# Patient Record
Sex: FEMALE | Race: WHITE | NOT HISPANIC OR LATINO | Employment: OTHER | URBAN - METROPOLITAN AREA
[De-identification: names, ages, dates, MRNs, and addresses within clinical notes are randomized per-mention and may not be internally consistent; named-entity substitution may affect disease eponyms.]

---

## 2017-04-26 ENCOUNTER — AMBULATORY - HEALTHEAST (OUTPATIENT)
Dept: CARDIOLOGY | Facility: CLINIC | Age: 72
End: 2017-04-26

## 2017-04-26 DIAGNOSIS — Z95.4 S/P TRICUSPID VALVE REPLACEMENT: ICD-10-CM

## 2017-04-26 DIAGNOSIS — I48.91 A-FIB (H): ICD-10-CM

## 2017-07-14 ENCOUNTER — AMBULATORY - HEALTHEAST (OUTPATIENT)
Dept: CARDIOLOGY | Facility: CLINIC | Age: 72
End: 2017-07-14

## 2017-07-14 ENCOUNTER — HOSPITAL ENCOUNTER (OUTPATIENT)
Dept: CARDIOLOGY | Facility: CLINIC | Age: 72
Discharge: HOME | End: 2017-07-14
Attending: INTERNAL MEDICINE

## 2017-07-14 DIAGNOSIS — Z95.4 S/P TRICUSPID VALVE REPLACEMENT: ICD-10-CM

## 2017-07-14 DIAGNOSIS — I27.29 OTHER SECONDARY PULMONARY HYPERTENSION (H): ICD-10-CM

## 2017-07-14 DIAGNOSIS — I48.91 A-FIB (H): ICD-10-CM

## 2017-07-17 LAB
AORTIC VALVE MEAN VELOCITY: 286 CM/S
AV DIMENSIONLESS INDEX VTI: 0.3
AV MEAN GRADIENT: 37 MMHG
AV PEAK GRADIENT: 60.8 MMHG
AV VALVE AREA: 0.6 CM2
AV VELOCITY RATIO: 0.3
DOP CALC AO PEAK VEL: 390 CM/S
DOP CALC AO VTI: 90.1 CM
DOP CALC LVOT AREA: 2.01 CM2
DOP CALC LVOT DIAMETER: 1.6 CM
DOP CALC LVOT PEAK VEL: 110 CM/S
DOP CALC LVOT STROKE VOLUME: 50.4 CM3
DOP CALC MV VTI: 32.8 CM
DOP CALCLVOT PEAK VEL VTI: 25.1 CM
ECHO EJECTION FRACTION ESTIMATED: 60 %
FRACTIONAL SHORTENING: 37.8 % (ref 28–44)
INTERVENTRICULAR SEPTUM IN END DIASTOLE: 1.3 CM (ref 0.6–0.9)
IVS/PW RATIO: 1
LEFT ATRIUM AREA: 26.9 CM2
LEFT VENTRICULAR INTERNAL DIMENSION IN DIASTOLE: 3.7 CM (ref 3.8–5.2)
LEFT VENTRICULAR INTERNAL DIMENSION IN SYSTOLE: 2.3 CM (ref 2.2–3.5)
LEFT VENTRICULAR MASS: 166.5 G
LEFT VENTRICULAR OUTFLOW TRACT MEAN GRADIENT: 3 MMHG
LEFT VENTRICULAR OUTFLOW TRACT MEAN VELOCITY: 75.4 CM/S
LEFT VENTRICULAR OUTFLOW TRACT PEAK GRADIENT: 5 MMHG
LEFT VENTRICULAR POSTERIOR WALL IN END DIASTOLE: 1.3 CM (ref 0.6–0.9)
MITRAL VALVE MEAN INFLOW VELOCITY: 88 CM/S
MITRAL VALVE PEAK VELOCITY: 178 CM/S
MV AREA VTI: 1.54 CM2
MV DECELERATION TIME: 204 MS
MV MEAN GRADIENT: 4 MMHG
MV PEAK E VELOCITY: 211 CM/S
MV PEAK GRADIENT: 12.7 MMHG
MV VALVE AREA BY CONTINUITY EQUATION: 1.5 CM2
PR MAX PG: 5 MMHG
PR PEAK VELOCITY: 108 CM/S
TRICUSPID VALVE ANULAR PLANE SYSTOLIC EXCURSION: 0.8 CM
TRICUSPID VALVE VELOCITY TIME INTEGRAL: 50.6 CM
TV MEAN GRADIENT: 3 MMHG
TV MEAN VELOCITY: 87.3 CM/S
TV PEAK GRADIENT: 9.5 MMHG
TV PEAK VELOCITY: 154 CM/S
TV VALVE AREA BY CONTINUITY EQUATION: 1 CM2

## 2017-07-28 ENCOUNTER — OFFICE VISIT - HEALTHEAST (OUTPATIENT)
Dept: CARDIOLOGY | Facility: CLINIC | Age: 72
End: 2017-07-28

## 2017-07-28 DIAGNOSIS — I48.21 PERMANENT ATRIAL FIBRILLATION (H): ICD-10-CM

## 2017-07-28 DIAGNOSIS — I27.89 OTHER CHRONIC PULMONARY HEART DISEASES: ICD-10-CM

## 2017-07-28 DIAGNOSIS — D64.9 ANEMIA: ICD-10-CM

## 2017-07-28 ASSESSMENT — MIFFLIN-ST. JEOR: SCORE: 987.12

## 2018-03-15 ENCOUNTER — COMMUNICATION - HEALTHEAST (OUTPATIENT)
Dept: CARDIOLOGY | Facility: CLINIC | Age: 73
End: 2018-03-15

## 2018-03-15 DIAGNOSIS — I48.91 ATRIAL FIBRILLATION (H): ICD-10-CM

## 2018-07-16 ENCOUNTER — AMBULATORY - HEALTHEAST (OUTPATIENT)
Dept: CARDIOLOGY | Facility: CLINIC | Age: 73
End: 2018-07-16

## 2018-07-16 ENCOUNTER — HOSPITAL ENCOUNTER (OUTPATIENT)
Dept: CARDIOLOGY | Facility: CLINIC | Age: 73
Discharge: HOME | End: 2018-07-16
Attending: INTERNAL MEDICINE

## 2018-07-16 DIAGNOSIS — I27.89 OTHER CHRONIC PULMONARY HEART DISEASES: ICD-10-CM

## 2018-07-16 DIAGNOSIS — I48.21 PERMANENT ATRIAL FIBRILLATION (H): ICD-10-CM

## 2018-07-16 LAB — BNP SERPL-MCNC: 68 PG/ML (ref 0–127)

## 2018-07-16 ASSESSMENT — MIFFLIN-ST. JEOR: SCORE: 983.49

## 2018-07-17 LAB
AORTIC ROOT: 2.7 CM
AORTIC VALVE MEAN VELOCITY: 274 CM/S
AV DIMENSIONLESS INDEX VTI: 0.2
AV MEAN GRADIENT: 35 MMHG
AV PEAK GRADIENT: 60.8 MMHG
AV VALVE AREA: 0.6 CM2
AV VELOCITY RATIO: 0.2
BSA FOR ECHO PROCEDURE: 1.51 M2
CV BLOOD PRESSURE: NORMAL MMHG
CV ECHO HEIGHT: 64 IN
CV ECHO WEIGHT: 111 LBS
DOP CALC AO PEAK VEL: 390 CM/S
DOP CALC AO VTI: 85 CM
DOP CALC LVOT AREA: 2.54 CM2
DOP CALC LVOT DIAMETER: 1.8 CM
DOP CALC LVOT PEAK VEL: 81.2 CM/S
DOP CALC LVOT STROKE VOLUME: 49.3 CM3
DOP CALC MV VTI: 36.3 CM
DOP CALCLVOT PEAK VEL VTI: 19.4 CM
ECHO EJECTION FRACTION ESTIMATED: 60 %
EJECTION FRACTION: 68 % (ref 55–75)
FRACTIONAL SHORTENING: 36.6 % (ref 28–44)
INTERVENTRICULAR SEPTUM IN END DIASTOLE: 1.1 CM (ref 0.6–0.9)
IVS/PW RATIO: 1
LEFT ATRIUM SIZE: 5 CM
LEFT ATRIUM TO AORTIC ROOT RATIO: 1.85 NO UNITS
LEFT VENTRICLE CARDIAC INDEX: 2.6 L/MIN/M2
LEFT VENTRICLE CARDIAC OUTPUT: 3.9 L/MIN
LEFT VENTRICLE DIASTOLIC VOLUME INDEX: 60.9 CM3/M2 (ref 34–74)
LEFT VENTRICLE DIASTOLIC VOLUME: 92 CM3 (ref 46–106)
LEFT VENTRICLE HEART RATE: 79 BPM
LEFT VENTRICLE MASS INDEX: 100.2 G/M2
LEFT VENTRICLE SYSTOLIC VOLUME INDEX: 19.2 CM3/M2 (ref 11–31)
LEFT VENTRICLE SYSTOLIC VOLUME: 29 CM3 (ref 14–42)
LEFT VENTRICULAR INTERNAL DIMENSION IN DIASTOLE: 4.1 CM (ref 3.8–5.2)
LEFT VENTRICULAR INTERNAL DIMENSION IN SYSTOLE: 2.6 CM (ref 2.2–3.5)
LEFT VENTRICULAR MASS: 151.3 G
LEFT VENTRICULAR OUTFLOW TRACT MEAN GRADIENT: 2 MMHG
LEFT VENTRICULAR OUTFLOW TRACT MEAN VELOCITY: 62.5 CM/S
LEFT VENTRICULAR OUTFLOW TRACT PEAK GRADIENT: 3 MMHG
LEFT VENTRICULAR POSTERIOR WALL IN END DIASTOLE: 1.1 CM (ref 0.6–0.9)
LV STROKE VOLUME INDEX: 32.7 ML/M2
MITRAL VALVE MEAN INFLOW VELOCITY: 101 CM/S
MITRAL VALVE PEAK VELOCITY: 196 CM/S
MV AREA VTI: 1.36 CM2
MV MEAN GRADIENT: 6 MMHG
MV PEAK GRADIENT: 15.4 MMHG
MV VALVE AREA BY CONTINUITY EQUATION: 1.4 CM2
NUC REST DIASTOLIC VOLUME INDEX: 1776 LBS
NUC REST SYSTOLIC VOLUME INDEX: 64 IN
TRICUSPID VALVE ANULAR PLANE SYSTOLIC EXCURSION: 1 CM
TRICUSPID VALVE VELOCITY TIME INTEGRAL: 42.4 CM
TV MEAN GRADIENT: 5 MMHG
TV MEAN VELOCITY: 114 CM/S
TV PEAK GRADIENT: 6.9 MMHG
TV PEAK VELOCITY: 131 CM/S
TV VALVE AREA BY CONTINUITY EQUATION: 1.2 CM2

## 2018-07-25 ENCOUNTER — OFFICE VISIT - HEALTHEAST (OUTPATIENT)
Dept: CARDIOLOGY | Facility: CLINIC | Age: 73
End: 2018-07-25

## 2018-07-25 DIAGNOSIS — I48.21 PERMANENT ATRIAL FIBRILLATION (H): ICD-10-CM

## 2018-07-25 DIAGNOSIS — E78.5 DYSLIPIDEMIA, GOAL LDL BELOW 100: ICD-10-CM

## 2018-07-25 DIAGNOSIS — E03.9 ADULT HYPOTHYROIDISM: ICD-10-CM

## 2018-07-25 ASSESSMENT — MIFFLIN-ST. JEOR: SCORE: 1006.17

## 2018-08-29 ENCOUNTER — COMMUNICATION - HEALTHEAST (OUTPATIENT)
Dept: CARDIOLOGY | Facility: CLINIC | Age: 73
End: 2018-08-29

## 2018-08-29 DIAGNOSIS — I48.91 ATRIAL FIBRILLATION (H): ICD-10-CM

## 2019-03-19 ENCOUNTER — COMMUNICATION - HEALTHEAST (OUTPATIENT)
Dept: CARDIOLOGY | Facility: CLINIC | Age: 74
End: 2019-03-19

## 2019-03-20 ENCOUNTER — HOSPITAL ENCOUNTER (OUTPATIENT)
Dept: CARDIOLOGY | Facility: HOSPITAL | Age: 74
Discharge: HOME | End: 2019-03-20
Attending: INTERNAL MEDICINE

## 2019-03-20 ENCOUNTER — AMBULATORY - HEALTHEAST (OUTPATIENT)
Dept: CARDIOLOGY | Facility: CLINIC | Age: 74
End: 2019-03-20

## 2019-03-20 ENCOUNTER — RECORDS - HEALTHEAST (OUTPATIENT)
Dept: ADMINISTRATIVE | Facility: OTHER | Age: 74
End: 2019-03-20

## 2019-03-20 ENCOUNTER — COMMUNICATION - HEALTHEAST (OUTPATIENT)
Dept: CARDIOLOGY | Facility: CLINIC | Age: 74
End: 2019-03-20

## 2019-03-20 DIAGNOSIS — I35.9 AORTIC VALVE DISORDER: ICD-10-CM

## 2019-03-20 DIAGNOSIS — I08.0 MITRAL AND AORTIC VALVE DISEASE: ICD-10-CM

## 2019-03-20 DIAGNOSIS — I48.21 PERMANENT ATRIAL FIBRILLATION (H): ICD-10-CM

## 2019-03-20 DIAGNOSIS — I50.30 HEART FAILURE WITH PRESERVED EJECTION FRACTION (H): ICD-10-CM

## 2019-03-20 DIAGNOSIS — Z95.4 S/P TRICUSPID VALVE REPLACEMENT: ICD-10-CM

## 2019-03-20 LAB
AORTIC ROOT: 2.4 CM
AORTIC VALVE MEAN VELOCITY: 318 CM/S
AV DIMENSIONLESS INDEX VTI: 0.3
AV MEAN GRADIENT: 43 MMHG
AV PEAK GRADIENT: 64.3 MMHG
AV VALVE AREA: 0.5 CM2
BSA FOR ECHO PROCEDURE: 1.54 M2
CV BLOOD PRESSURE: ABNORMAL MMHG
CV ECHO HEIGHT: 64 IN
CV ECHO WEIGHT: 116 LBS
DOP CALC AO PEAK VEL: 401 CM/S
DOP CALC AO VTI: 98.5 CM
DOP CALC LVOT AREA: 2.01 CM2
DOP CALC LVOT DIAMETER: 1.6 CM
DOP CALC LVOT STROKE VOLUME: 50 CM3
DOP CALC MV VTI: 49.6 CM
DOP CALCLVOT PEAK VEL VTI: 24.9 CM
EJECTION FRACTION: 74 % (ref 55–75)
FRACTIONAL SHORTENING: 33.3 % (ref 28–44)
INTERVENTRICULAR SEPTUM IN END DIASTOLE: 0.9 CM (ref 0.6–0.9)
IVS/PW RATIO: 0.9
LA AREA 1: 43.3 CM2
LA AREA 2: 40.1 CM2
LEFT ATRIUM LENGTH: 7.98 CM
LEFT ATRIUM VOLUME INDEX: 120.1 ML/M2
LEFT ATRIUM VOLUME: 184.9 ML
LEFT VENTRICLE CARDIAC INDEX: 2.2 L/MIN/M2
LEFT VENTRICLE CARDIAC OUTPUT: 3.4 L/MIN
LEFT VENTRICLE DIASTOLIC VOLUME INDEX: 37 CM3/M2 (ref 29–61)
LEFT VENTRICLE DIASTOLIC VOLUME: 57 CM3 (ref 46–106)
LEFT VENTRICLE HEART RATE: 67 BPM
LEFT VENTRICLE MASS INDEX: 49.4 G/M2
LEFT VENTRICLE SYSTOLIC VOLUME INDEX: 9.7 CM3/M2 (ref 8–24)
LEFT VENTRICLE SYSTOLIC VOLUME: 15 CM3 (ref 14–42)
LEFT VENTRICULAR INTERNAL DIMENSION IN DIASTOLE: 3 CM (ref 3.8–5.2)
LEFT VENTRICULAR INTERNAL DIMENSION IN SYSTOLE: 2 CM (ref 2.2–3.5)
LEFT VENTRICULAR MASS: 76 G
LEFT VENTRICULAR OUTFLOW TRACT MEAN GRADIENT: 3 MMHG
LEFT VENTRICULAR OUTFLOW TRACT MEAN VELOCITY: 78 CM/S
LEFT VENTRICULAR POSTERIOR WALL IN END DIASTOLE: 1 CM (ref 0.6–0.9)
LV STROKE VOLUME INDEX: 32.5 ML/M2
MITRAL VALVE DECELERATION SLOPE: ABNORMAL MM/S2
MITRAL VALVE MEAN INFLOW VELOCITY: 136 CM/S
MITRAL VALVE PEAK VELOCITY: 247 CM/S
MITRAL VALVE PRESSURE HALF-TIME: 53 MS
MV AREA VTI: 1.01 CM2
MV AVERAGE E/E' RATIO: 49.4 CM/S
MV DECELERATION TIME: 190 MS
MV E'TISSUE VEL-LAT: 4.97 CM/S
MV E'TISSUE VEL-MED: 4.39 CM/S
MV LATERAL E/E' RATIO: 46.5
MV MEAN GRADIENT: 7 MMHG
MV MEDIAL E/E' RATIO: 52.6
MV PEAK E VELOCITY: 231 CM/S
MV PEAK GRADIENT: 24.4 MMHG
MV VALVE AREA BY CONTINUITY EQUATION: 1 CM2
MV VALVE AREA PRESSURE 1/2 METHOD: 4.2 CM2
NUC REST DIASTOLIC VOLUME INDEX: 1856 LBS
NUC REST SYSTOLIC VOLUME INDEX: 64 IN
TRICUSPID VALVE ANULAR PLANE SYSTOLIC EXCURSION: 1.5 CM
TRICUSPID VALVE VELOCITY TIME INTEGRAL: 51.4 CM
TV MEAN GRADIENT: 6 MMHG
TV MEAN VELOCITY: 107 CM/S
TV PEAK GRADIENT: 13.2 MMHG
TV PEAK VELOCITY: 182 CM/S
TV VALVE AREA BY CONTINUITY EQUATION: 1 CM2

## 2019-03-20 ASSESSMENT — MIFFLIN-ST. JEOR: SCORE: 1006.17

## 2019-03-21 ENCOUNTER — OFFICE VISIT - HEALTHEAST (OUTPATIENT)
Dept: CARDIOLOGY | Facility: CLINIC | Age: 74
End: 2019-03-21

## 2019-03-21 DIAGNOSIS — I50.30 HEART FAILURE WITH PRESERVED EJECTION FRACTION (H): ICD-10-CM

## 2019-03-21 DIAGNOSIS — R60.9 EDEMA, UNSPECIFIED TYPE: ICD-10-CM

## 2019-03-21 LAB
ANION GAP SERPL CALCULATED.3IONS-SCNC: 9 MMOL/L (ref 5–18)
BUN SERPL-MCNC: 13 MG/DL (ref 8–28)
CALCIUM SERPL-MCNC: 8.5 MG/DL (ref 8.5–10.5)
CHLORIDE BLD-SCNC: 101 MMOL/L (ref 98–107)
CO2 SERPL-SCNC: 30 MMOL/L (ref 22–31)
CREAT SERPL-MCNC: 0.68 MG/DL (ref 0.6–1.1)
GFR SERPL CREATININE-BSD FRML MDRD: >60 ML/MIN/1.73M2
GLUCOSE BLD-MCNC: 96 MG/DL (ref 70–125)
POTASSIUM BLD-SCNC: 4.3 MMOL/L (ref 3.5–5)
SODIUM SERPL-SCNC: 140 MMOL/L (ref 136–145)

## 2019-03-21 ASSESSMENT — MIFFLIN-ST. JEOR: SCORE: 1015.24

## 2019-03-25 ENCOUNTER — COMMUNICATION - HEALTHEAST (OUTPATIENT)
Dept: CARDIOLOGY | Facility: CLINIC | Age: 74
End: 2019-03-25

## 2019-03-25 DIAGNOSIS — R60.9 EDEMA, UNSPECIFIED TYPE: ICD-10-CM

## 2019-03-26 ENCOUNTER — SURGERY - HEALTHEAST (OUTPATIENT)
Dept: CARDIOLOGY | Facility: CLINIC | Age: 74
End: 2019-03-26

## 2019-03-28 ENCOUNTER — COMMUNICATION - HEALTHEAST (OUTPATIENT)
Dept: CARDIOLOGY | Facility: CLINIC | Age: 74
End: 2019-03-28

## 2019-04-01 ENCOUNTER — COMMUNICATION - HEALTHEAST (OUTPATIENT)
Dept: CARDIOLOGY | Facility: CLINIC | Age: 74
End: 2019-04-01

## 2019-04-01 DIAGNOSIS — I48.91 ATRIAL FIBRILLATION (H): ICD-10-CM

## 2019-04-02 ENCOUNTER — OFFICE VISIT - HEALTHEAST (OUTPATIENT)
Dept: CARDIOLOGY | Facility: CLINIC | Age: 74
End: 2019-04-02

## 2019-04-02 DIAGNOSIS — I50.30 HEART FAILURE WITH PRESERVED EJECTION FRACTION (H): ICD-10-CM

## 2019-04-02 LAB
ANION GAP SERPL CALCULATED.3IONS-SCNC: 8 MMOL/L (ref 5–18)
BUN SERPL-MCNC: 19 MG/DL (ref 8–28)
CALCIUM SERPL-MCNC: 9.2 MG/DL (ref 8.5–10.5)
CHLORIDE BLD-SCNC: 103 MMOL/L (ref 98–107)
CO2 SERPL-SCNC: 30 MMOL/L (ref 22–31)
CREAT SERPL-MCNC: 0.72 MG/DL (ref 0.6–1.1)
GFR SERPL CREATININE-BSD FRML MDRD: >60 ML/MIN/1.73M2
GLUCOSE BLD-MCNC: 94 MG/DL (ref 70–125)
POTASSIUM BLD-SCNC: 4.5 MMOL/L (ref 3.5–5)
SODIUM SERPL-SCNC: 141 MMOL/L (ref 136–145)

## 2019-04-02 ASSESSMENT — MIFFLIN-ST. JEOR: SCORE: 992.56

## 2019-04-04 ENCOUNTER — COMMUNICATION - HEALTHEAST (OUTPATIENT)
Dept: CARDIOLOGY | Facility: CLINIC | Age: 74
End: 2019-04-04

## 2019-04-05 ENCOUNTER — SURGERY - HEALTHEAST (OUTPATIENT)
Dept: CARDIOLOGY | Facility: CLINIC | Age: 74
End: 2019-04-05

## 2019-04-05 ASSESSMENT — MIFFLIN-ST. JEOR: SCORE: 959.97

## 2019-04-08 ENCOUNTER — AMBULATORY - HEALTHEAST (OUTPATIENT)
Dept: CARDIOLOGY | Facility: CLINIC | Age: 74
End: 2019-04-08

## 2019-04-08 DIAGNOSIS — R60.9 EDEMA, UNSPECIFIED TYPE: ICD-10-CM

## 2019-04-08 DIAGNOSIS — I50.30 HEART FAILURE WITH PRESERVED EJECTION FRACTION (H): ICD-10-CM

## 2019-05-14 ENCOUNTER — AMBULATORY - HEALTHEAST (OUTPATIENT)
Dept: CARDIOLOGY | Facility: CLINIC | Age: 74
End: 2019-05-14

## 2019-05-14 DIAGNOSIS — I48.21 PERMANENT ATRIAL FIBRILLATION (H): ICD-10-CM

## 2019-05-14 DIAGNOSIS — I50.30 HEART FAILURE WITH PRESERVED EJECTION FRACTION (H): ICD-10-CM

## 2019-05-14 LAB
ALBUMIN SERPL-MCNC: 3.5 G/DL (ref 3.5–5)
ALP SERPL-CCNC: 55 U/L (ref 45–120)
ALT SERPL W P-5'-P-CCNC: 22 U/L (ref 0–45)
ANION GAP SERPL CALCULATED.3IONS-SCNC: 8 MMOL/L (ref 5–18)
AST SERPL W P-5'-P-CCNC: 31 U/L (ref 0–40)
BILIRUB SERPL-MCNC: 0.8 MG/DL (ref 0–1)
BNP SERPL-MCNC: 55 PG/ML (ref 0–130)
BUN SERPL-MCNC: 17 MG/DL (ref 8–28)
CALCIUM SERPL-MCNC: 9.4 MG/DL (ref 8.5–10.5)
CHLORIDE BLD-SCNC: 104 MMOL/L (ref 98–107)
CO2 SERPL-SCNC: 28 MMOL/L (ref 22–31)
CREAT SERPL-MCNC: 0.73 MG/DL (ref 0.6–1.1)
GFR SERPL CREATININE-BSD FRML MDRD: >60 ML/MIN/1.73M2
GLUCOSE BLD-MCNC: 100 MG/DL (ref 70–125)
HGB BLD-MCNC: 11.6 G/DL (ref 12–16)
MAGNESIUM SERPL-MCNC: 2.2 MG/DL (ref 1.8–2.6)
POTASSIUM BLD-SCNC: 4.7 MMOL/L (ref 3.5–5)
PROT SERPL-MCNC: 6.9 G/DL (ref 6–8)
SODIUM SERPL-SCNC: 140 MMOL/L (ref 136–145)
TSH SERPL DL<=0.005 MIU/L-ACNC: 0.41 UIU/ML (ref 0.3–5)

## 2019-05-21 ENCOUNTER — OFFICE VISIT - HEALTHEAST (OUTPATIENT)
Dept: CARDIOLOGY | Facility: CLINIC | Age: 74
End: 2019-05-21

## 2019-05-21 DIAGNOSIS — I48.21 PERMANENT ATRIAL FIBRILLATION (H): ICD-10-CM

## 2019-05-21 DIAGNOSIS — E78.5 DYSLIPIDEMIA, GOAL LDL BELOW 100: ICD-10-CM

## 2019-05-21 ASSESSMENT — MIFFLIN-ST. JEOR: SCORE: 974.88

## 2019-08-28 ENCOUNTER — COMMUNICATION - HEALTHEAST (OUTPATIENT)
Dept: ADMINISTRATIVE | Facility: CLINIC | Age: 74
End: 2019-08-28

## 2019-09-23 ENCOUNTER — COMMUNICATION - HEALTHEAST (OUTPATIENT)
Dept: CARDIOLOGY | Facility: CLINIC | Age: 74
End: 2019-09-23

## 2019-09-23 DIAGNOSIS — I48.91 ATRIAL FIBRILLATION (H): ICD-10-CM

## 2019-11-06 ENCOUNTER — OFFICE VISIT - HEALTHEAST (OUTPATIENT)
Dept: CARDIOLOGY | Facility: CLINIC | Age: 74
End: 2019-11-06

## 2019-11-06 DIAGNOSIS — I48.21 PERMANENT ATRIAL FIBRILLATION (H): ICD-10-CM

## 2019-11-06 DIAGNOSIS — R60.9 EDEMA, UNSPECIFIED TYPE: ICD-10-CM

## 2019-11-06 DIAGNOSIS — E78.5 DYSLIPIDEMIA, GOAL LDL BELOW 100: ICD-10-CM

## 2019-11-06 DIAGNOSIS — R06.02 SHORTNESS OF BREATH: ICD-10-CM

## 2019-11-06 ASSESSMENT — MIFFLIN-ST. JEOR: SCORE: 994.84

## 2019-12-17 ENCOUNTER — COMMUNICATION - HEALTHEAST (OUTPATIENT)
Dept: CARDIOLOGY | Facility: CLINIC | Age: 74
End: 2019-12-17

## 2020-01-01 ENCOUNTER — OFFICE VISIT - HEALTHEAST (OUTPATIENT)
Dept: CARDIOLOGY | Facility: CLINIC | Age: 75
End: 2020-01-01

## 2020-01-01 ENCOUNTER — RECORDS - HEALTHEAST (OUTPATIENT)
Dept: LAB | Facility: CLINIC | Age: 75
End: 2020-01-01

## 2020-01-01 ENCOUNTER — COMMUNICATION - HEALTHEAST (OUTPATIENT)
Dept: CARDIOLOGY | Facility: CLINIC | Age: 75
End: 2020-01-01

## 2020-01-01 ENCOUNTER — COMMUNICATION - HEALTHEAST (OUTPATIENT)
Dept: SCHEDULING | Facility: CLINIC | Age: 75
End: 2020-01-01

## 2020-01-01 ENCOUNTER — TELEPHONE (OUTPATIENT)
Dept: SURGERY | Facility: CLINIC | Age: 75
End: 2020-01-01

## 2020-01-01 ENCOUNTER — DOCUMENTATION ONLY (OUTPATIENT)
Dept: PULMONOLOGY | Facility: CLINIC | Age: 75
End: 2020-01-01

## 2020-01-01 ENCOUNTER — OFFICE VISIT - HEALTHEAST (OUTPATIENT)
Dept: PULMONOLOGY | Facility: OTHER | Age: 75
End: 2020-01-01

## 2020-01-01 ENCOUNTER — HOSPITAL ENCOUNTER (OUTPATIENT)
Dept: CARDIOLOGY | Facility: CLINIC | Age: 75
Discharge: HOME | End: 2020-09-03
Attending: INTERNAL MEDICINE

## 2020-01-01 ENCOUNTER — AMBULATORY - HEALTHEAST (OUTPATIENT)
Dept: CARDIOLOGY | Facility: CLINIC | Age: 75
End: 2020-01-01

## 2020-01-01 ENCOUNTER — COMMUNICATION - HEALTHEAST (OUTPATIENT)
Dept: PULMONOLOGY | Facility: OTHER | Age: 75
End: 2020-01-01

## 2020-01-01 ENCOUNTER — PREP FOR PROCEDURE (OUTPATIENT)
Dept: PULMONOLOGY | Facility: CLINIC | Age: 75
End: 2020-01-01

## 2020-01-01 ENCOUNTER — HOSPITAL ENCOUNTER (OUTPATIENT)
Dept: CARDIOLOGY | Facility: CLINIC | Age: 75
Discharge: HOME | End: 2020-11-09
Attending: INTERNAL MEDICINE

## 2020-01-01 ENCOUNTER — HOSPITAL ENCOUNTER (OUTPATIENT)
Dept: RADIOLOGY | Facility: CLINIC | Age: 75
Discharge: HOME | End: 2020-12-01
Attending: INTERNAL MEDICINE

## 2020-01-01 ENCOUNTER — AMBULATORY - HEALTHEAST (OUTPATIENT)
Dept: LAB | Facility: CLINIC | Age: 75
End: 2020-01-01

## 2020-01-01 ENCOUNTER — AMBULATORY - HEALTHEAST (OUTPATIENT)
Dept: PULMONOLOGY | Facility: OTHER | Age: 75
End: 2020-01-01

## 2020-01-01 ENCOUNTER — HOSPITAL ENCOUNTER (OUTPATIENT)
Dept: CT IMAGING | Facility: CLINIC | Age: 75
Discharge: HOME | End: 2020-09-18
Attending: INTERNAL MEDICINE

## 2020-01-01 ENCOUNTER — HOSPITAL ENCOUNTER (OUTPATIENT)
Facility: CLINIC | Age: 75
Discharge: HOME | End: 2020-12-16
Attending: INTERNAL MEDICINE | Admitting: INTERNAL MEDICINE
Payer: COMMERCIAL

## 2020-01-01 ENCOUNTER — HOSPITAL ENCOUNTER (OUTPATIENT)
Dept: CT IMAGING | Facility: CLINIC | Age: 75
Discharge: HOME | End: 2020-09-30
Attending: INTERNAL MEDICINE

## 2020-01-01 ENCOUNTER — AMBULATORY - HEALTHEAST (OUTPATIENT)
Dept: INTENSIVE CARE | Facility: CLINIC | Age: 75
End: 2020-01-01

## 2020-01-01 ENCOUNTER — RECORDS - HEALTHEAST (OUTPATIENT)
Dept: ADMINISTRATIVE | Facility: OTHER | Age: 75
End: 2020-01-01

## 2020-01-01 ENCOUNTER — HOSPITAL ENCOUNTER (OUTPATIENT)
Facility: CLINIC | Age: 75
End: 2020-01-01
Attending: INTERNAL MEDICINE | Admitting: INTERNAL MEDICINE
Payer: COMMERCIAL

## 2020-01-01 ENCOUNTER — TELEPHONE (OUTPATIENT)
Dept: PULMONOLOGY | Facility: CLINIC | Age: 75
End: 2020-01-01

## 2020-01-01 ENCOUNTER — PREP FOR PROCEDURE (OUTPATIENT)
Dept: SURGERY | Facility: CLINIC | Age: 75
End: 2020-01-01

## 2020-01-01 ENCOUNTER — AMBULATORY - HEALTHEAST (OUTPATIENT)
Dept: ULTRASOUND IMAGING | Facility: HOSPITAL | Age: 75
End: 2020-01-01

## 2020-01-01 ENCOUNTER — APPOINTMENT (OUTPATIENT)
Dept: GENERAL RADIOLOGY | Facility: CLINIC | Age: 75
End: 2020-01-01
Attending: INTERNAL MEDICINE
Payer: COMMERCIAL

## 2020-01-01 VITALS
DIASTOLIC BLOOD PRESSURE: 62 MMHG | SYSTOLIC BLOOD PRESSURE: 105 MMHG | RESPIRATION RATE: 18 BRPM | HEART RATE: 73 BPM | OXYGEN SATURATION: 100 %

## 2020-01-01 DIAGNOSIS — I50.23 ACUTE ON CHRONIC SYSTOLIC HEART FAILURE (H): ICD-10-CM

## 2020-01-01 DIAGNOSIS — J90 RECURRENT PLEURAL EFFUSION ON LEFT: ICD-10-CM

## 2020-01-01 DIAGNOSIS — Z95.4 S/P TRICUSPID VALVE REPLACEMENT: ICD-10-CM

## 2020-01-01 DIAGNOSIS — J90 PLEURAL EFFUSION: Primary | ICD-10-CM

## 2020-01-01 DIAGNOSIS — I48.21 PERMANENT ATRIAL FIBRILLATION (H): ICD-10-CM

## 2020-01-01 DIAGNOSIS — Z11.59 ENCOUNTER FOR SCREENING FOR OTHER VIRAL DISEASES: ICD-10-CM

## 2020-01-01 DIAGNOSIS — I50.32 CHRONIC HEART FAILURE WITH PRESERVED EJECTION FRACTION (HFPEF) (H): ICD-10-CM

## 2020-01-01 DIAGNOSIS — I08.0 MITRAL AND AORTIC VALVE DISEASE: ICD-10-CM

## 2020-01-01 DIAGNOSIS — Z95.2 S/P MVR (MITRAL VALVE REPLACEMENT): ICD-10-CM

## 2020-01-01 DIAGNOSIS — J90 PLEURAL EFFUSION: ICD-10-CM

## 2020-01-01 DIAGNOSIS — I48.19 PERSISTENT ATRIAL FIBRILLATION (H): ICD-10-CM

## 2020-01-01 DIAGNOSIS — I50.23 ACUTE ON CHRONIC SYSTOLIC (CONGESTIVE) HEART FAILURE (H): ICD-10-CM

## 2020-01-01 DIAGNOSIS — J90 RECURRENT RIGHT PLEURAL EFFUSION: Primary | ICD-10-CM

## 2020-01-01 DIAGNOSIS — Z11.59 ENCOUNTER FOR SCREENING FOR OTHER VIRAL DISEASES: Primary | ICD-10-CM

## 2020-01-01 DIAGNOSIS — J90 RECURRENT RIGHT PLEURAL EFFUSION: ICD-10-CM

## 2020-01-01 DIAGNOSIS — Z95.2 S/P AVR (AORTIC VALVE REPLACEMENT): ICD-10-CM

## 2020-01-01 DIAGNOSIS — R94.39 ABNORMAL RESULT OF OTHER CARDIOVASCULAR FUNCTION STUDY: ICD-10-CM

## 2020-01-01 DIAGNOSIS — R06.09 DOE (DYSPNEA ON EXERTION): ICD-10-CM

## 2020-01-01 DIAGNOSIS — R06.02 SHORTNESS OF BREATH: ICD-10-CM

## 2020-01-01 LAB
ALBUMIN SERPL-MCNC: 3.5 G/DL (ref 3.5–5)
ALBUMIN SERPL-MCNC: 3.5 G/DL (ref 3.5–5)
ALP SERPL-CCNC: 45 U/L (ref 45–120)
ALT SERPL W P-5'-P-CCNC: 13 U/L (ref 0–45)
ANION GAP SERPL CALCULATED.3IONS-SCNC: 11 MMOL/L (ref 5–18)
ANION GAP SERPL CALCULATED.3IONS-SCNC: 6 MMOL/L (ref 5–18)
ANION GAP SERPL CALCULATED.3IONS-SCNC: 8 MMOL/L (ref 5–18)
ANION GAP SERPL CALCULATED.3IONS-SCNC: 9 MMOL/L (ref 5–18)
AORTIC ROOT: 3 CM
AORTIC VALVE MEAN VELOCITY: 230 CM/S
ASCENDING AORTA: 2.5 CM
AST SERPL W P-5'-P-CCNC: 36 U/L (ref 0–40)
AV DIMENSIONLESS INDEX VTI: 0.4
AV MEAN GRADIENT: 25 MMHG
AV PEAK GRADIENT: 47.6 MMHG
AV VALVE AREA: 1.2 CM2
AV VELOCITY RATIO: 0.5
BILIRUB DIRECT SERPL-MCNC: 0.5 MG/DL
BILIRUB SERPL-MCNC: 2.2 MG/DL (ref 0–1)
BNP SERPL-MCNC: 198 PG/ML (ref 0–133)
BSA FOR ECHO PROCEDURE: 1.52 M2
BSA FOR ECHO PROCEDURE: 1.53 M2
BUN SERPL-MCNC: 23 MG/DL (ref 8–28)
BUN SERPL-MCNC: 23 MG/DL (ref 8–28)
BUN SERPL-MCNC: 26 MG/DL (ref 8–28)
BUN SERPL-MCNC: 26 MG/DL (ref 8–28)
CALCIUM SERPL-MCNC: 8 MG/DL (ref 8.5–10.5)
CALCIUM SERPL-MCNC: 8.3 MG/DL (ref 8.5–10.5)
CALCIUM SERPL-MCNC: 8.3 MG/DL (ref 8.5–10.5)
CALCIUM SERPL-MCNC: 8.7 MG/DL (ref 8.5–10.5)
CHLORIDE BLD-SCNC: 91 MMOL/L (ref 98–107)
CHLORIDE BLD-SCNC: 91 MMOL/L (ref 98–107)
CHLORIDE BLD-SCNC: 92 MMOL/L (ref 98–107)
CHLORIDE BLD-SCNC: 92 MMOL/L (ref 98–107)
CO2 SERPL-SCNC: 27 MMOL/L (ref 22–31)
CO2 SERPL-SCNC: 28 MMOL/L (ref 22–31)
CO2 SERPL-SCNC: 30 MMOL/L (ref 22–31)
CO2 SERPL-SCNC: 31 MMOL/L (ref 22–31)
CREAT SERPL-MCNC: 0.72 MG/DL (ref 0.6–1.1)
CREAT SERPL-MCNC: 0.73 MG/DL (ref 0.6–1.1)
CREAT SERPL-MCNC: 0.75 MG/DL (ref 0.6–1.1)
CREAT SERPL-MCNC: 0.81 MG/DL (ref 0.6–1.1)
CV BLOOD PRESSURE: ABNORMAL MMHG
CV ECHO HEIGHT: 63 IN
CV ECHO WEIGHT: 116 LBS
DOP CALC AO PEAK VEL: 345 CM/S
DOP CALC AO VTI: 69.4 CM
DOP CALC LVOT AREA: 2.83 CM2
DOP CALC LVOT DIAMETER: 1.9 CM
DOP CALC LVOT PEAK VEL: 161 CM/S
DOP CALC LVOT STROKE VOLUME: 79.9 CM3
DOP CALC MV VTI: 35.5 CM
DOP CALCLVOT PEAK VEL VTI: 28.2 CM
EJECTION FRACTION: 57 % (ref 55–75)
FRACTIONAL SHORTENING: 18.4 % (ref 28–44)
GFR SERPL CREATININE-BSD FRML MDRD: >60 ML/MIN/1.73M2
GLUCOSE BLD-MCNC: 132 MG/DL (ref 70–125)
GLUCOSE BLD-MCNC: 133 MG/DL (ref 70–125)
GLUCOSE BLD-MCNC: 150 MG/DL (ref 70–125)
GLUCOSE BLD-MCNC: 195 MG/DL (ref 70–125)
HGB BLD-MCNC: 10 G/DL (ref 12–16)
INR PPP: 1.4 (ref 0.86–1.14)
INR PPP: 2.43 (ref 0.9–1.1)
INR PPP: 3 (ref 0.9–1.1)
INR PPP: 3.12 (ref 0.9–1.1)
INR PPP: 3.17 (ref 0.9–1.1)
INR PPP: 3.51 (ref 0.9–1.1)
INTERVENTRICULAR SEPTUM IN END DIASTOLE: 0.7 CM (ref 0.6–0.9)
IVS/PW RATIO: 0.6
LA AREA 1: 34.7 CM2
LA AREA 2: 44 CM2
LEFT ATRIUM LENGTH: 7.47 CM
LEFT ATRIUM SIZE: 5.4 CM
LEFT ATRIUM TO AORTIC ROOT RATIO: 1.8 NO UNITS
LEFT ATRIUM VOLUME INDEX: 113.6 ML/M2
LEFT ATRIUM VOLUME: 173.7 ML
LEFT VENTRICLE CARDIAC INDEX: 3.4 L/MIN/M2
LEFT VENTRICLE CARDIAC OUTPUT: 5.2 L/MIN
LEFT VENTRICLE DIASTOLIC VOLUME INDEX: 44.4 CM3/M2 (ref 29–61)
LEFT VENTRICLE DIASTOLIC VOLUME: 68 CM3 (ref 46–106)
LEFT VENTRICLE HEART RATE: 65 BPM
LEFT VENTRICLE HEART RATE: 82 BPM
LEFT VENTRICLE MASS INDEX: 66.1 G/M2
LEFT VENTRICLE SYSTOLIC VOLUME INDEX: 19 CM3/M2 (ref 8–24)
LEFT VENTRICLE SYSTOLIC VOLUME: 29 CM3 (ref 14–42)
LEFT VENTRICULAR INTERNAL DIMENSION IN DIASTOLE: 3.8 CM (ref 3.8–5.2)
LEFT VENTRICULAR INTERNAL DIMENSION IN SYSTOLE: 3.1 CM (ref 2.2–3.5)
LEFT VENTRICULAR MASS: 101.1 G
LEFT VENTRICULAR OUTFLOW TRACT MEAN GRADIENT: 7 MMHG
LEFT VENTRICULAR OUTFLOW TRACT MEAN VELOCITY: 116 CM/S
LEFT VENTRICULAR OUTFLOW TRACT PEAK GRADIENT: 10 MMHG
LEFT VENTRICULAR POSTERIOR WALL IN END DIASTOLE: 1.1 CM (ref 0.6–0.9)
LMWH PPP CHRO-ACNC: 0.1 IU/ML
LV STROKE VOLUME INDEX: 52.2 ML/M2
MITRAL VALVE DECELERATION SLOPE: ABNORMAL MM/S2
MITRAL VALVE MEAN INFLOW VELOCITY: 133 CM/S
MITRAL VALVE PEAK VELOCITY: 212 CM/S
MITRAL VALVE PRESSURE HALF-TIME: 55 MS
MV AREA VTI: 2.25 CM2
MV DECELERATION TIME: 185 MS
MV MEAN GRADIENT: 10 MMHG
MV PEAK E VELOCITY: 156 CM/S
MV PEAK GRADIENT: 18 MMHG
MV VALVE AREA BY CONTINUITY EQUATION: 2.3 CM2
MV VALVE AREA PRESSURE 1/2 METHOD: 4 CM2
NUC REST DIASTOLIC VOLUME INDEX: 1856 LBS
NUC REST SYSTOLIC VOLUME INDEX: 63 IN
PHOSPHATE SERPL-MCNC: 3.6 MG/DL (ref 2.5–4.5)
POTASSIUM BLD-SCNC: 3.9 MMOL/L (ref 3.5–5)
POTASSIUM BLD-SCNC: 4.1 MMOL/L (ref 3.5–5)
POTASSIUM BLD-SCNC: 4.7 MMOL/L (ref 3.5–5)
POTASSIUM BLD-SCNC: 4.8 MMOL/L (ref 3.5–5)
PROT SERPL-MCNC: 6.5 G/DL (ref 6–8)
SARS-COV-2 PCR COMMENT: NORMAL
SARS-COV-2 RNA SPEC QL NAA+PROBE: NEGATIVE
SARS-COV-2 RNA SPEC QL NAA+PROBE: NOT DETECTED
SARS-COV-2 VIRUS SPECIMEN SOURCE: NORMAL
SODIUM SERPL-SCNC: 128 MMOL/L (ref 136–145)
SODIUM SERPL-SCNC: 128 MMOL/L (ref 136–145)
SODIUM SERPL-SCNC: 129 MMOL/L (ref 136–145)
SODIUM SERPL-SCNC: 131 MMOL/L (ref 136–145)
SPECIMEN SOURCE: NORMAL
TRICUSPID VALVE VELOCITY TIME INTEGRAL: 50.1 CM
TV MEAN GRADIENT: 7 MMHG
TV MEAN VELOCITY: 108 CM/S
TV PEAK GRADIENT: 13.2 MMHG
TV PEAK VELOCITY: 182 CM/S
TV VALVE AREA BY CONTINUITY EQUATION: 1.6 CM2

## 2020-01-01 PROCEDURE — 85610 PROTHROMBIN TIME: CPT | Performed by: INTERNAL MEDICINE

## 2020-01-01 PROCEDURE — 85520 HEPARIN ASSAY: CPT | Performed by: INTERNAL MEDICINE

## 2020-01-01 PROCEDURE — 71045 X-RAY EXAM CHEST 1 VIEW: CPT | Mod: 26 | Performed by: RADIOLOGY

## 2020-01-01 PROCEDURE — 32550 INSERT PLEURAL CATH: CPT | Performed by: INTERNAL MEDICINE

## 2020-01-01 PROCEDURE — U0003 INFECTIOUS AGENT DETECTION BY NUCLEIC ACID (DNA OR RNA); SEVERE ACUTE RESPIRATORY SYNDROME CORONAVIRUS 2 (SARS-COV-2) (CORONAVIRUS DISEASE [COVID-19]), AMPLIFIED PROBE TECHNIQUE, MAKING USE OF HIGH THROUGHPUT TECHNOLOGIES AS DESCRIBED BY CMS-2020-01-R: HCPCS | Performed by: INTERNAL MEDICINE

## 2020-01-01 PROCEDURE — 999N000028 XR CHEST PORT 1 VW

## 2020-01-01 PROCEDURE — 32551 INSERTION OF CHEST TUBE: CPT | Performed by: INTERNAL MEDICINE

## 2020-01-01 RX ORDER — LIDOCAINE 40 MG/G
CREAM TOPICAL
Status: CANCELLED | OUTPATIENT
Start: 2020-01-01

## 2020-01-01 SDOH — HEALTH STABILITY: MENTAL HEALTH: HOW OFTEN DO YOU HAVE A DRINK CONTAINING ALCOHOL?: NEVER

## 2020-01-01 ASSESSMENT — MIFFLIN-ST. JEOR
SCORE: 990.3
SCORE: 990.3
SCORE: 999.37
SCORE: 926.8
SCORE: 926.8
SCORE: 985.77
SCORE: 966.48
SCORE: 1009.35

## 2020-01-16 ENCOUNTER — COMMUNICATION - HEALTHEAST (OUTPATIENT)
Dept: CARDIOLOGY | Facility: CLINIC | Age: 75
End: 2020-01-16

## 2020-01-20 ENCOUNTER — AMBULATORY - HEALTHEAST (OUTPATIENT)
Dept: CARDIOLOGY | Facility: CLINIC | Age: 75
End: 2020-01-20

## 2020-01-20 DIAGNOSIS — I48.21 PERMANENT ATRIAL FIBRILLATION (H): ICD-10-CM

## 2020-01-20 LAB
ATRIAL RATE - MUSE: 79 BPM
DIASTOLIC BLOOD PRESSURE - MUSE: NORMAL
INTERPRETATION ECG - MUSE: NORMAL
P AXIS - MUSE: 10 DEGREES
PR INTERVAL - MUSE: 302 MS
QRS DURATION - MUSE: 98 MS
QT - MUSE: 400 MS
QTC - MUSE: 458 MS
R AXIS - MUSE: 58 DEGREES
SYSTOLIC BLOOD PRESSURE - MUSE: NORMAL
T AXIS - MUSE: -63 DEGREES
VENTRICULAR RATE- MUSE: 79 BPM

## 2020-01-20 ASSESSMENT — MIFFLIN-ST. JEOR: SCORE: 990.3

## 2020-12-07 PROBLEM — J90 PLEURAL EFFUSION: Status: ACTIVE | Noted: 2020-01-01

## 2020-12-07 NOTE — TELEPHONE ENCOUNTER
Spoke with patient to schedule procedure with Bryan Avendaño    Procedure was scheduled on 12/09 in ENDO  Patient will have H&P with: not needed    Patient is aware a COVID-19 test is needed before their procedure. The test should be with-in 4 days of their procedure.   Test Details: Patient will call [facility] to schedule, wanted something closer    Patient is aware a / is needed day of surgery.   Patient has my direct contact information for any further questions.

## 2020-12-08 NOTE — TELEPHONE ENCOUNTER
I tried to reach Chiquita, left her a VM telling her we needed to cancel her procedure tomorrow because her INR today was flagged high (>4) and sent out for a stat test.   I told her our  will reach out and try to get her rescheduled Friday.   I told her to continue to hold her warfarin and to start enoxaparin injections q 12 hours, but hold dose the night before and morning of surgery.       I called [facility] to get message to the charge nurse there, she will make note of this.

## 2020-12-08 NOTE — TELEPHONE ENCOUNTER
Pt wants us to know she got the message.  Her pharmacy is [facility] [address], [place] [address]  [phone]      Pt requests a call back to answer questions

## 2020-12-09 NOTE — TELEPHONE ENCOUNTER
Patient was rescheduled to 12/11 with Dr. Knott  Patient had no other questions, procedure is dependent on INR tomorrow.

## 2020-12-09 NOTE — PROGRESS NOTES
I called Chiquita to review the change in her scheduled procedure with Dr. Avendaño and to let her know that the enoxaparin Rx was sent to the [facility], as requested.   I reviewed the instructions for taking this q 12 hrs, and to hold it the night before and morning of the procedure.   She confirmed that she is currently holding her warfarin, as well.    She said she is scheduled for another INR at her [facility] clinic at noon Thursday, 12/10.    I told her our  would be trying to get her rescheduled Friday but it will all hinge on the INR results.

## 2020-12-15 NOTE — TELEPHONE ENCOUNTER
Called patient back as she had questions regarding her medications she should take tomorrow prior to her procedure. Her sister Edith answered the phone and had other questions regarding the Pleurx procedure. Patient can hold all medications except Metoprolol. She had an INR done yesterday and it was 1.6. Answered all of Mary questions.

## 2020-12-15 NOTE — TELEPHONE ENCOUNTER
Received message from Dr. Avendaño that patient's sister was calling with questions about Chiquita's procedure tomorrow. I called her back but there was no answer. Message left with call back information.

## 2020-12-16 NOTE — PROCEDURES
INTERVENTIONAL PULMONOLOGY       Procedure(s):    Tunnelled pleural catheter placement (left chest)    Indication:  Recurrent left pleural effusion    Attending of Record:  Marino Gottlieb MD     Interventional Pulmonary Fellow   None    Trainees Present:   None     Medications:    15 ml 1% lidocaine    Sedation Time:   None    Time Out:  Performed    The patient's medical record has been reviewed.  The indication for the procedure was reviewed.  The necessary history and physical examination was performed and reviewed.  The risks, benefits and alternatives of the procedure were discussed with the the patient in detail and she had the opportunity to ask questions.  I discussed in particular the potential complications including risks of minor or life-threatening bleeding and/or infection, respiratory failure, vocal cord trauma / paralysis, pneumothorax, and discomfort. Sedation risks were also discussed including abnormal heart rhythms, low blood pressure, and respiratory failure. All questions were answered to the best of my ability.  Verbal and written informed consent was obtained.  The proposed procedure and the patient's identification were verified prior to the procedure by the physician and the nurse, respiratory therapist.    The patient was assessed for the adequacy for the procedure and to receive medications.   Mental Status:  Alert and oriented x 3  Airway examination:  Class I (Complete visualization of soft palate)  Pulmonary:  Decreased breathsound throughout  CV:  RRR, no murmurs or gallops  ASA Grade:  (I)  Normal healthy patient    After clinical evaluation and reviewing the indication, risks, alternatives and benefits of the procedure the patient was deemed to be in satisfactory condition to undergo the procedure.           A Tuberculosis risk assessment was performed:  The patient has no known RISK of Tuberculosis    The procedure was performed in a negative airflow room: The patient could not  be moved to a negative airflow room because of critical illness and instability    Maneuvers / Procedure:      Indwelling pleural catheter insertion: Once the site was marked using US, the pleurX catheter kit was used for the procedure. The patient's left chest was prepped in sterile fashion. A total of 10 1%lidocaine used to anesthetize the area. A finder needle was used to aspirate fluid. The wire was advanced using seldinger technique. A 1cm incision was made approximately 5cm anterior to the wire and at the wire site. The tunneling device was used to make a track towards the wire and insert the chest tube. A dilator was used to enlarge the track, then the peel-away catheter was used to insert the chest tube into the pleural space. The catheter was sutured into place using 2.0silk. A sterile dressing was placed. A total of 900cc was drained     Any disposable equipment was visually inspected and deemed to be intact immediately post procedure.      Relevant Pictures      Recommendations:     -->  Successful left IPC placement  -->  Post-op CXR  -->  Plan to drain daily for next 12 weeks. Clinic appt with me in 4-6wks  -->  Pt education consult      Marino Gottlieb MD, MHA    of Medicine  Interventional Pulmonary  Department of Pulmonary, Allergy, Critical Care and Sleep Medicine   [facility]  Pager: [phone]   Office: [phone]  Email: [email or web address]    Romina ROBLES, OCN  Clinical Nurse Specialist  Department of Thoracic Surgery  Office: [phone]  Email: [email or web address]    Kathia White  Interventional Pulmonology Surgery Scheduler  Office: [phone]  Email: [email or web address]

## 2020-12-16 NOTE — TELEPHONE ENCOUNTER
Called sister Edith back as she left a voicemail for me this morning that patient had malt o meal for breakfast at 9am. Dr. Gottlieb said this was okay but to not eat anything else.    Tried to call the sister back but there was no answer. When I talked with Edith yesterday she said they were going to be to the hospital between 10:30 and 11am.

## 2020-12-16 NOTE — OR NURSING
Pleurx catheter insertion, no sedation and tolerated well. Pt will have teaching with her sister as an outpatient at the patient learning center.   Drainage 800 ml of rach fluid.

## 2020-12-20 NOTE — TELEPHONE ENCOUNTER
Received a phone call from Ms. Florse's sister (Edith) regarding PleurX catheter management.    Briefly, Ms. 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 She underwent PleurX cather placement with Dr. Gottlieb on 12/16/2020.    Sharon calls today as they have not been educated yet on how to manage the PleurX cather, including how to drain it on a regular basis. Per Edith, Ms. Flores is having some increased shortness of breath today.    Dr. Roa, Interventional Pulmonary fellow, was able to join in on the phone call and guided Edith on how to properly drain the catheter. She was then able to drain approximately 1 L of pleural fluid. We will follow up with Dr. Gottlieb's team tomorrow regarding further catheter management and education.

## 2021-01-01 ENCOUNTER — ANCILLARY PROCEDURE (OUTPATIENT)
Dept: GENERAL RADIOLOGY | Facility: CLINIC | Age: 76
End: 2021-01-01
Attending: CLINICAL NURSE SPECIALIST
Payer: COMMERCIAL

## 2021-01-01 ENCOUNTER — TELEPHONE (OUTPATIENT)
Dept: SURGERY | Facility: CLINIC | Age: 76
End: 2021-01-01

## 2021-01-01 ENCOUNTER — VIRTUAL VISIT (OUTPATIENT)
Dept: PULMONOLOGY | Facility: CLINIC | Age: 76
End: 2021-01-01
Attending: INTERNAL MEDICINE
Payer: COMMERCIAL

## 2021-01-01 ENCOUNTER — COMMUNICATION - HEALTHEAST (OUTPATIENT)
Dept: CARDIOLOGY | Facility: CLINIC | Age: 76
End: 2021-01-01

## 2021-01-01 ENCOUNTER — OFFICE VISIT - HEALTHEAST (OUTPATIENT)
Dept: PULMONOLOGY | Facility: OTHER | Age: 76
End: 2021-01-01

## 2021-01-01 DIAGNOSIS — J90 PLEURAL EFFUSION: ICD-10-CM

## 2021-01-01 DIAGNOSIS — J90 RECURRENT RIGHT PLEURAL EFFUSION: ICD-10-CM

## 2021-01-01 DIAGNOSIS — J90 PLEURAL EFFUSION: Primary | ICD-10-CM

## 2021-01-01 PROCEDURE — 71046 X-RAY EXAM CHEST 2 VIEWS: CPT | Mod: GC | Performed by: RADIOLOGY

## 2021-01-01 PROCEDURE — 71046 X-RAY EXAM CHEST 2 VIEWS: CPT | Performed by: RADIOLOGY

## 2021-01-01 PROCEDURE — 99214 OFFICE O/P EST MOD 30 MIN: CPT | Mod: 95 | Performed by: INTERNAL MEDICINE

## 2021-01-01 ASSESSMENT — MIFFLIN-ST. JEOR: SCORE: 926.8

## 2021-01-18 NOTE — TELEPHONE ENCOUNTER
"I called patient's sister, Edith who had questions about the Pleurx output.  She has drained daily for 300-400ml, fluid is reddish and there can be a lot of bubbles in the drainage container.  Sometimes it takes 20-25\" to be done draining and she worried this was not normal. She will be getting a CXR later this and week, and talking to Dr. Gottlieb next week.   Edith said Alla is laying down much of the day, not experiencing any shortness of breath.     I reassured her about these issues and told her that the tube must be in the right position if she gets that much fluid out daily, told her the CXR will let Dr. Gottlieb know about position and see how much fluid is there and he will answer any additional questions.  She appreciated my call.  "

## 2021-01-26 NOTE — LETTER
"1/26/2021       RE: Chiquita Flores  [address] [facility] [address]     Dear Colleague,    Thank you for referring your patient, Chiquita Flores, to the [facility] at [facility]. Please see a copy of my visit note below.    Chiquita is a 75 year old who is being evaluated via a billable telephone visit.      What phone number would you like to be contacted at? [phone]  How would you like to obtain your AVS? Mail a copy     I have reviewed and updated the patient's allergies and medication list.    Concerns: none  Refills: none     Vitals - Patient Reported  Weight (Patient Reported): 45.4 kg (100 lb)  Height (Patient Reported): 160 cm (5' 3\")  BMI (Based on Pt Reported Ht/Wt): 17.71  Pain Score: No Pain (0)    Sherrie Pereira CMA        Phone call duration: 25 minutes      LUNG NODULE & INTERVENTIONAL PULMONARY CLINIC  CLINICS & SURGERY CENTER, [facility]   VIRTUAL TELEPHONE VISIT    Chiquita Flores MRN# [id number]   Age: 75 year old YOB: 1945     Reason for Consultation: pleurx    Requesting Physician: Chalino Donovan MD  No address on file    Chiquita Flores is a 75 year old female who is being evaluated via a billable telephone visit.      The patient has been notified of following: \"This telephone visit will be conducted via a call between you and your physician/provider. We have found that certain health care needs can be provided without the need for a physical exam.  This service lets us provide the care you need with a short phone conversation.  If a prescription is necessary we can send it directly to your pharmacy.  If lab work is needed we can place an order for that and you can then stop by our lab to have the test done at a later time. Telephone visits are billed at different rates depending on your insurance coverage. During this emergency period, for some " "insurers they may be billed the same as an in-person visit.  Please reach out to your insurance provider with any questions. If during the course of the call the physician/provider feels a telephone visit is not appropriate, you will not be charged for this service.\"    Patient has given verbal consent for Telephone visit?  Yes    How would you like to obtain your AVS? Erumhart    Phone call duration: 25 minutes    Additional provider notes: see below     Assessment and Plan:    1. 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 Draining 350-500cc daily. Skin site is clean per sister. Will cont daily drainage until mid March. F/u in clinic with cxr at that point.     Billing: I spent 30 minutes on direct patient care and greater than 50% of thise time was spent on counseling and coordination of care about the issues above    Marino Gottlieb MD   of Medicine  Interventional Pulmonology  Department of Pulmonary, Allergy, Critical Care and Sleep Medicine   [facility]  Pager: [phone]          History:     Chiquita Flores is a 75 year old female with sig h/o for CHF, severe ascites, cardiac cirrhosis, atrial fibrillation, hypothyroidism who is here for evaluation/followup of pleural effusion.    - No new resp sx or complaints. Denies dyspnea or cough.   - here for follow-up pleurx which was placed 12/16. Draining 350-500cc per day since placement.   - Personal hx of cancer: no cancer. Up-to-date on mammo and c-scope   - Family hx of cancer: no lung cancer  - Exposure hx: Denies asbestos or radon exposure   - Tobacco hx: Never smoker   - My interpretation of the images relevant for this visit includes: minimal effusions   - My interpretation of the PFT's relevant for this visit includes: None     CXR  Minimal effusions. Left pleurx in place.     Other active medical problems include:   - has CHF, afib and cardiac cirrhosis. Stable on coumadin.   - has hypothyroidism. stable "          Past Medical History:    No past medical history on file.            Past Surgical History:      Past Surgical History:   Procedure Laterality Date     INSERT CHEST TUBE Left 12/16/2020    Procedure: INSERTION, CATHETER, INTERCOSTAL, FOR DRAINAGE, pleurx catheter placement;  Surgeon: Marino Gottlieb MD;  Location: [facility]     REDO STERNOTOMY REPLACE VALVE TRICUSPID N/A 9/1/2015    Procedure: REDO STERNOTOMY REPLACE VALVE TRICUSPID;  Surgeon: Neo Teague MD;  Location:  OR          Social History:     Social History     Tobacco Use     Smoking status: Never Smoker     Smokeless tobacco: Never Used   Substance Use Topics     Alcohol use: Never     Alcohol/week: 0.0 standard drinks     Frequency: Never          Family History:   No family history on file.          Allergies:    No Known Allergies          Medications:     Current Outpatient Medications   Medication Sig     acetaminophen (TYLENOL) 325 MG tablet Take 325-650 mg by mouth every 6 hours as needed for mild pain     alendronate (FOSAMAX) 70 MG tablet Take 70 mg by mouth every 7 days     blood glucose monitoring (NO BRAND SPECIFIED) test strip Use to test blood sugar 2 times daily or as directed.     Calcium Carb-Cholecalciferol 600-400 MG-UNIT TABS      CANE, ANY MATERIAL      Cream Base (EMOLLIENT BASE) CREA      diltiazem (CARDIZEM) 30 MG tablet Take 1 tablet (30 mg) by mouth every 6 hours     enoxaparin ANTICOAGULANT (LOVENOX ANTICOAGULANT) 40 MG/0.4ML syringe Inject 0.4 mLs (40 mg) Subcutaneous 2 times daily Hold this injection the night BEFORE and the MORNING of surgery.     furosemide (LASIX) 40 MG tablet Take 0.5 tablets (20 mg) by mouth 2 times daily     lancets 28G MISC      levothyroxine (SYNTHROID, LEVOTHROID) 125 MCG tablet Take 125 mcg by mouth daily     lisinopril (PRINIVIL,ZESTRIL) 2.5 MG tablet Take 1 tablet (2.5 mg) by mouth daily     metoprolol (TOPROL-XL) 25 MG 24 hr tablet Take 1.5 tablets (37.5 mg) by mouth daily     Misc.  Devices (ROLLER WALKER) MISC 2 Wheeled Walker for home use. For 12 weeks.     simvastatin (ZOCOR) 20 MG tablet Take 0.5 tablets (10 mg) by mouth At Bedtime     warfarin (COUMADIN) 5 MG tablet Take 1.5 tablets (7.5 mg) by mouth daily Take 1.5 tablets daily, or as directed by INR clinic.   Check INR Monday 9/14/15, goal INR is 2.5 - 3.5 for mechanical MVR/AVR     No current facility-administered medications for this visit.           Review of Systems:     CONSTITUTIONAL: negative for fever, chills, change in weight  INTEGUMENTARY/SKIN: no rash or obvious new lesions  ENT/MOUTH: no sore throat, new sinus pain or nasal drainage  RESP: see interval history  CV: negative for chest pain, palpitations or peripheral edema  GI: no nausea, vomiting, change in stools  : no dysuria  MUSCULOSKELETAL: no myalgias, arthralgias  ENDOCRINE: blood sugars with adequate control  PSYCHIATRIC: mood stable  LYMPHATIC: no new lymphadenopathy  HEME: no bleeding or easy bruisability  NEURO: no numbness, weakness, headaches         Physical Exam:      A comprehensive physical examination is deferred due to PHE (public health emergency) telephone visit restrictions.         Current Laboratory Data:   All laboratory and imaging data reviewed.    Results for orders placed or performed in visit on 01/26/21 (from the past 24 hour(s))   XR Chest 2 Views    Narrative    EXAMINATION:  XR CHEST 2 VW 1/26/2021 11:21 AM.    COMPARISON: 1/20/2021.    HISTORY:  Pleural effusion    FINDINGS: PA and lateral views of the chest. Postsurgical changes of  tricuspid valve replacement with intact median sternotomy wires and  mediastinal surgical clips. Midline trachea. Unchanged cardiomegaly.  Small bilateral pleural effusions, greater on the right similar to  prior. Bibasilar streaky opacities. No new focal airspace  consolidation. No pneumothorax. Left basilar chest tube.      Impression    IMPRESSION:   1. Stable small bilateral pleural effusions with  associated  atelectasis. No acute airspace opacity.  2. Stable cardiomegaly.    I have personally reviewed the examination and initial interpretation  and I agree with the findings.    COLTON HAINES MD            Previous Cardiology Imaging   No results found for this or any previous visit (from the past 8760 hour(s)).                   Again, thank you for allowing me to participate in the care of your patient.      Sincerely,    Marino Gottlieb MD

## 2021-01-26 NOTE — LETTER
Date:January 28, 2021      Patient was self referred, no letter generated. Do not send.        [facility] Information

## 2021-01-26 NOTE — PROGRESS NOTES
"LUNG NODULE & INTERVENTIONAL PULMONARY CLINIC  CLINICS & SURGERY CENTER, [facility], [facility]   VIRTUAL TELEPHONE VISIT    Chiquita Flores MRN# [id number]   Age: 75 year old YOB: 1945     Reason for Consultation: pleurx    Requesting Physician: Chalino Donovan MD  No address on file    Chiquita Flores is a 75 year old female who is being evaluated via a billable telephone visit.      The patient has been notified of following: \"This telephone visit will be conducted via a call between you and your physician/provider. We have found that certain health care needs can be provided without the need for a physical exam.  This service lets us provide the care you need with a short phone conversation.  If a prescription is necessary we can send it directly to your pharmacy.  If lab work is needed we can place an order for that and you can then stop by our lab to have the test done at a later time. Telephone visits are billed at different rates depending on your insurance coverage. During this emergency period, for some insurers they may be billed the same as an in-person visit.  Please reach out to your insurance provider with any questions. If during the course of the call the physician/provider feels a telephone visit is not appropriate, you will not be charged for this service.\"    Patient has given verbal consent for Telephone visit?  Yes    How would you like to obtain your AVS? MyChart    Phone call duration: 25 minutes    Additional provider notes: see below     Assessment and Plan:    1. 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 Draining 350-500cc daily. Skin site is clean per sister. Will cont daily drainage until mid March. F/u in clinic with cxr at that point.     Billing: I spent 30 minutes on direct patient care and greater than 50% of thise time was spent on counseling and coordination of care about the issues above    Marino Gottlieb MD  Assistant " Professor of Medicine  Interventional Pulmonology  Department of Pulmonary, Allergy, Critical Care and Sleep Medicine   [facility], [facility]  Pager: [phone]          History:     Chiquita Flores is a 75 year old female with sig h/o for CHF, severe ascites, cardiac cirrhosis, atrial fibrillation, hypothyroidism who is here for evaluation/followup of pleural effusion.    - No new resp sx or complaints. Denies dyspnea or cough.   - here for follow-up pleurx which was placed 12/16. Draining 350-500cc per day since placement.   - Personal hx of cancer: no cancer. Up-to-date on mammo and c-scope   - Family hx of cancer: no lung cancer  - Exposure hx: Denies asbestos or radon exposure   - Tobacco hx: Never smoker   - My interpretation of the images relevant for this visit includes: minimal effusions   - My interpretation of the PFT's relevant for this visit includes: None     CXR  Minimal effusions. Left pleurx in place.     Other active medical problems include:   - has CHF, afib and cardiac cirrhosis. Stable on coumadin.   - has hypothyroidism. stable          Past Medical History:    No past medical history on file.            Past Surgical History:      Past Surgical History:   Procedure Laterality Date     INSERT CHEST TUBE Left 12/16/2020    Procedure: INSERTION, CATHETER, INTERCOSTAL, FOR DRAINAGE, pleurx catheter placement;  Surgeon: Marino Gottlieb MD;  Location: [facility]     REDO STERNOTOMY REPLACE VALVE TRICUSPID N/A 9/1/2015    Procedure: REDO STERNOTOMY REPLACE VALVE TRICUSPID;  Surgeon: Neo Teague MD;  Location:  OR          Social History:     Social History     Tobacco Use     Smoking status: Never Smoker     Smokeless tobacco: Never Used   Substance Use Topics     Alcohol use: Never     Alcohol/week: 0.0 standard drinks     Frequency: Never          Family History:   No family history on file.          Allergies:    No Known Allergies          Medications:     Current Outpatient  Medications   Medication Sig     acetaminophen (TYLENOL) 325 MG tablet Take 325-650 mg by mouth every 6 hours as needed for mild pain     alendronate (FOSAMAX) 70 MG tablet Take 70 mg by mouth every 7 days     blood glucose monitoring (NO BRAND SPECIFIED) test strip Use to test blood sugar 2 times daily or as directed.     Calcium Carb-Cholecalciferol 600-400 MG-UNIT TABS      CANE, ANY MATERIAL      Cream Base (EMOLLIENT BASE) CREA      diltiazem (CARDIZEM) 30 MG tablet Take 1 tablet (30 mg) by mouth every 6 hours     enoxaparin ANTICOAGULANT (LOVENOX ANTICOAGULANT) 40 MG/0.4ML syringe Inject 0.4 mLs (40 mg) Subcutaneous 2 times daily Hold this injection the night BEFORE and the MORNING of surgery.     furosemide (LASIX) 40 MG tablet Take 0.5 tablets (20 mg) by mouth 2 times daily     lancets 28G MISC      levothyroxine (SYNTHROID, LEVOTHROID) 125 MCG tablet Take 125 mcg by mouth daily     lisinopril (PRINIVIL,ZESTRIL) 2.5 MG tablet Take 1 tablet (2.5 mg) by mouth daily     metoprolol (TOPROL-XL) 25 MG 24 hr tablet Take 1.5 tablets (37.5 mg) by mouth daily     Misc. Devices (ROLLER WALKER) MISC 2 Wheeled Walker for home use. For 12 weeks.     simvastatin (ZOCOR) 20 MG tablet Take 0.5 tablets (10 mg) by mouth At Bedtime     warfarin (COUMADIN) 5 MG tablet Take 1.5 tablets (7.5 mg) by mouth daily Take 1.5 tablets daily, or as directed by INR clinic.   Check INR Monday 9/14/15, goal INR is 2.5 - 3.5 for mechanical MVR/AVR     No current facility-administered medications for this visit.           Review of Systems:     CONSTITUTIONAL: negative for fever, chills, change in weight  INTEGUMENTARY/SKIN: no rash or obvious new lesions  ENT/MOUTH: no sore throat, new sinus pain or nasal drainage  RESP: see interval history  CV: negative for chest pain, palpitations or peripheral edema  GI: no nausea, vomiting, change in stools  : no dysuria  MUSCULOSKELETAL: no myalgias, arthralgias  ENDOCRINE: blood sugars with adequate  control  PSYCHIATRIC: mood stable  LYMPHATIC: no new lymphadenopathy  HEME: no bleeding or easy bruisability  NEURO: no numbness, weakness, headaches         Physical Exam:      A comprehensive physical examination is deferred due to PHE (public health emergency) telephone visit restrictions.         Current Laboratory Data:   All laboratory and imaging data reviewed.    Results for orders placed or performed in visit on 01/26/21 (from the past 24 hour(s))   XR Chest 2 Views    Narrative    EXAMINATION:  XR CHEST 2 VW 1/26/2021 11:21 AM.    COMPARISON: 1/20/2021.    HISTORY:  Pleural effusion    FINDINGS: PA and lateral views of the chest. Postsurgical changes of  tricuspid valve replacement with intact median sternotomy wires and  mediastinal surgical clips. Midline trachea. Unchanged cardiomegaly.  Small bilateral pleural effusions, greater on the right similar to  prior. Bibasilar streaky opacities. No new focal airspace  consolidation. No pneumothorax. Left basilar chest tube.      Impression    IMPRESSION:   1. Stable small bilateral pleural effusions with associated  atelectasis. No acute airspace opacity.  2. Stable cardiomegaly.    I have personally reviewed the examination and initial interpretation  and I agree with the findings.    COLTON HAINES MD            Previous Cardiology Imaging   No results found for this or any previous visit (from the past 8760 hour(s)).

## 2021-01-26 NOTE — PROGRESS NOTES
"Chiquita is a 75 year old who is being evaluated via a billable telephone visit.      What phone number would you like to be contacted at? [phone]  How would you like to obtain your AVS? Mail a copy     I have reviewed and updated the patient's allergies and medication list.    Concerns: none  Refills: none     Vitals - Patient Reported  Weight (Patient Reported): 45.4 kg (100 lb)  Height (Patient Reported): 160 cm (5' 3\")  BMI (Based on Pt Reported Ht/Wt): 17.71  Pain Score: No Pain (0)    Sherrie Pereira CMA        Phone call duration: 25 minutes    "

## 2021-03-16 NOTE — PROGRESS NOTES
Chiquita is a 76 year old who is being evaluated via a billable telephone visit.      What phone number would you like to be contacted at? [phone]  How would you like to obtain your AVS? Mail a copy     The individual isn't 100% about her medications.     Charley Cornelius, FRANSICO March 16, 2021  1:00 PM       Phone call duration: 25 minutes

## 2021-03-16 NOTE — LETTER
"3/16/2021       RE: Chiquita Flores  [address] [address]     Dear Colleague,    Thank you for referring your patient[facility]iquita Flores[facility]. Please see a copy of my visit note below.    Chiquita is a 76 year old who is being evaluated via a billable telephone visit.      What phone number would you like to be contacted at? [phone]  How would you like to obtain your AVS? Mail a copy     The individual isn't 100% about her medications.     Charley Cornelius, FRANSICO March 16, 2021  1:00 PM       Phone call duration: 25 minutes    LUNG NODULE & INTERVENTIONAL PULMONARY CLINIC  CLINICS & SURGERY CENTER, [facility]   VIRTUAL TELEPHONE VISIT    Chiquita Flores MRN# [id number]   Age: 76 year old YOB: 1945     Reason for Consultation: lung nodule(s)    Chiquita Flores is a 76 year old female who is being evaluated via a billable telephone visit.      The patient has been notified of following: \"This telephone visit will be conducted via a call between you and your physician/provider. We have found that certain health care needs can be provided without the need for a physical exam.  This service lets us provide the care you need with a short phone conversation.  If a prescription is necessary we can send it directly to your pharmacy.  If lab work is needed we can place an order for that and you can then stop by our lab to have the test done at a later time. Telephone visits are billed at different rates depending on your insurance coverage. During this emergency period, for some insurers they may be billed the same as an in-person visit.  Please reach out to your insurance provider with any questions. If during the course of the call the physician/provider feels a telephone visit is not appropriate, you will not be charged for this " "service.\"    Patient has given verbal consent for Telephone visit?  Yes    How would you like to obtain your AVS? MyChart    Phone call duration: 25 minutes    Additional provider notes: see below     Assessment and Plan:    1. 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 Draining 340cc daily. Skin site is clean per sister. No issues with drainage. Given that she is moving towards hospice care, I told her she could follow-up with as needed unless there is concern for infection and/or no drainage for 2 consecutive days.       Billing: I spent 30 minutes including face to face, chart review, personal and documented interpretation of results, counseling and coordination of care about the issues above.      Marino Gottlieb MD   of Medicine  Interventional Pulmonology  Department of Pulmonary, Allergy, Critical Care and Sleep Medicine   [facility]  Pager: [phone]          History:     Chiquita Flores is a 75 year old female with sig h/o for CHF, severe ascites, cardiac cirrhosis, atrial fibrillation, hypothyroidism who is here for evaluation/followup of pleural effusion.     - No new resp sx or complaints. Denies dyspnea or cough.   - here for follow-up pleurx which was placed 12/16. Draining ~340cc daily. Now in hospice.   - Personal hx of cancer: no cancer. Up-to-date on mammo and c-scope   - Family hx of cancer: no lung cancer  - Exposure hx: Denies asbestos or radon exposure   - Tobacco hx: Never smoker   - My interpretation of the images relevant for this visit includes: minimal effusions   - My interpretation of the PFT's relevant for this visit includes: None      CXR  Minimal effusions. Left pleurx in place.      Other active medical problems include:   - has CHF, afib and cardiac cirrhosis. Stable on coumadin.   - has hypothyroidism. stable          Past Medical History:   CHF, severe ascites, cardiac cirrhosis, atrial fibrillation, hypothyroidism         Past " Surgical History:      Past Surgical History:   Procedure Laterality Date     INSERT CHEST TUBE Left 12/16/2020    Procedure: INSERTION, CATHETER, INTERCOSTAL, FOR DRAINAGE, pleurx catheter placement;  Surgeon: Marino Gottlieb MD;  Location: [facility]     REDO STERNOTOMY REPLACE VALVE TRICUSPID N/A 9/1/2015    Procedure: REDO STERNOTOMY REPLACE VALVE TRICUSPID;  Surgeon: Neo Teague MD;  Location:  OR          Social History:     Social History     Tobacco Use     Smoking status: Never Smoker     Smokeless tobacco: Never Used   Substance Use Topics     Alcohol use: Never     Alcohol/week: 0.0 standard drinks     Frequency: Never          Family History:   No family history on file.        Allergies:    No Known Allergies       Medications:     Current Outpatient Medications   Medication Sig     levothyroxine (SYNTHROID, LEVOTHROID) 125 MCG tablet Take 125 mcg by mouth daily     warfarin (COUMADIN) 5 MG tablet Take 1.5 tablets (7.5 mg) by mouth daily Take 1.5 tablets daily, or as directed by INR clinic.   Check INR Monday 9/14/15, goal INR is 2.5 - 3.5 for mechanical MVR/AVR     acetaminophen (TYLENOL) 325 MG tablet Take 325-650 mg by mouth every 6 hours as needed for mild pain     alendronate (FOSAMAX) 70 MG tablet Take 70 mg by mouth every 7 days     blood glucose monitoring (NO BRAND SPECIFIED) test strip Use to test blood sugar 2 times daily or as directed.     Calcium Carb-Cholecalciferol 600-400 MG-UNIT TABS      CANE, ANY MATERIAL      Cream Base (EMOLLIENT BASE) CREA      diltiazem (CARDIZEM) 30 MG tablet Take 1 tablet (30 mg) by mouth every 6 hours     enoxaparin ANTICOAGULANT (LOVENOX ANTICOAGULANT) 40 MG/0.4ML syringe Inject 0.4 mLs (40 mg) Subcutaneous 2 times daily Hold this injection the night BEFORE and the MORNING of surgery. (Patient not taking: Reported on 3/16/2021)     furosemide (LASIX) 40 MG tablet Take 0.5 tablets (20 mg) by mouth 2 times daily     lancets 28G MISC      lisinopril  (PRINIVIL,ZESTRIL) 2.5 MG tablet Take 1 tablet (2.5 mg) by mouth daily     metoprolol (TOPROL-XL) 25 MG 24 hr tablet Take 1.5 tablets (37.5 mg) by mouth daily     Misc. Devices (ROLLER WALKER) MISC 2 Wheeled Walker for home use. For 12 weeks.     simvastatin (ZOCOR) 20 MG tablet Take 0.5 tablets (10 mg) by mouth At Bedtime     No current facility-administered medications for this visit.           Review of Systems:     CONSTITUTIONAL: negative for fever, chills, change in weight  INTEGUMENTARY/SKIN: no rash or obvious new lesions  ENT/MOUTH: no sore throat, new sinus pain or nasal drainage  RESP: see interval history  CV: negative for chest pain, palpitations or peripheral edema  GI: no nausea, vomiting, change in stools  : no dysuria  MUSCULOSKELETAL: no myalgias, arthralgias  ENDOCRINE: blood sugars with adequate control  PSYCHIATRIC: mood stable  LYMPHATIC: no new lymphadenopathy  HEME: no bleeding or easy bruisability  NEURO: no numbness, weakness, headaches         Physical Exam:      A comprehensive physical examination is deferred due to [facility] (public health emergency) telephone visit restrictions.         Current Laboratory Data:   All laboratory and imaging data reviewed.             Previous Cardiology Imaging   No results found for this or any previous visit (from the past 8760 hour(s)).                   Again, thank you for allowing me to participate in the care of your patient.      Sincerely,    Marino Gottlieb MD

## 2021-03-16 NOTE — PROGRESS NOTES
"LUNG NODULE & INTERVENTIONAL PULMONARY CLINIC  CLINICS & SURGERY CENTER, [facility], [facility]   VIRTUAL TELEPHONE VISIT    Chiquita Flores MRN# [id number]   Age: 76 year old YOB: 1945     Reason for Consultation: lung nodule(s)    Chiquita Flores is a 76 year old female who is being evaluated via a billable telephone visit.      The patient has been notified of following: \"This telephone visit will be conducted via a call between you and your physician/provider. We have found that certain health care needs can be provided without the need for a physical exam.  This service lets us provide the care you need with a short phone conversation.  If a prescription is necessary we can send it directly to your pharmacy.  If lab work is needed we can place an order for that and you can then stop by our lab to have the test done at a later time. Telephone visits are billed at different rates depending on your insurance coverage. During this emergency period, for some insurers they may be billed the same as an in-person visit.  Please reach out to your insurance provider with any questions. If during the course of the call the physician/provider feels a telephone visit is not appropriate, you will not be charged for this service.\"    Patient has given verbal consent for Telephone visit?  Yes    How would you like to obtain your AVS? Alexandro    Phone call duration: 25 minutes    Additional provider notes: see below     Assessment and Plan:    1. 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 Draining 340cc daily. Skin site is clean per sister. No issues with drainage. Given that she is moving towards hospice care, I told her she could follow-up with as needed unless there is concern for infection and/or no drainage for 2 consecutive days.       Billing: I spent 30 minutes including face to face, chart review, personal and documented interpretation of results, counseling and " coordination of care about the issues above.      Marino Gottlieb MD   of Medicine  Interventional Pulmonology  Department of Pulmonary, Allergy, Critical Care and Sleep Medicine   [facility]  Pager: [phone]          History:     Chiquita Flores is a 75 year old female with sig h/o for CHF, severe ascites, cardiac cirrhosis, atrial fibrillation, hypothyroidism who is here for evaluation/followup of pleural effusion.     - No new resp sx or complaints. Denies dyspnea or cough.   - here for follow-up pleurx which was placed 12/16. Draining ~340cc daily. Now in hospice.   - Personal hx of cancer: no cancer. Up-to-date on mammo and c-scope   - Family hx of cancer: no lung cancer  - Exposure hx: Denies asbestos or radon exposure   - Tobacco hx: Never smoker   - My interpretation of the images relevant for this visit includes: minimal effusions   - My interpretation of the PFT's relevant for this visit includes: None      CXR  Minimal effusions. Left pleurx in place.      Other active medical problems include:   - has CHF, afib and cardiac cirrhosis. Stable on coumadin.   - has hypothyroidism. stable          Past Medical History:   CHF, severe ascites, cardiac cirrhosis, atrial fibrillation, hypothyroidism         Past Surgical History:      Past Surgical History:   Procedure Laterality Date     INSERT CHEST TUBE Left 12/16/2020    Procedure: INSERTION, CATHETER, INTERCOSTAL, FOR DRAINAGE, pleurx catheter placement;  Surgeon: Marino Gottlieb MD;  [facility]     REDO STERNOTOMY REPLACE VALVE TRICUSPID N/A 9/1/2015    Procedure: REDO STERNOTOMY REPLACE VALVE TRICUSPID;  Surgeon: Neo Teague MD;  Location:  OR          Social History:     Social History     Tobacco Use     Smoking status: Never Smoker     Smokeless tobacco: Never Used   Substance Use Topics     Alcohol use: Never     Alcohol/week: 0.0 standard drinks     Frequency: Never          Family History:   No  family history on file.        Allergies:    No Known Allergies       Medications:     Current Outpatient Medications   Medication Sig     levothyroxine (SYNTHROID, LEVOTHROID) 125 MCG tablet Take 125 mcg by mouth daily     warfarin (COUMADIN) 5 MG tablet Take 1.5 tablets (7.5 mg) by mouth daily Take 1.5 tablets daily, or as directed by INR clinic.   Check INR Monday 9/14/15, goal INR is 2.5 - 3.5 for mechanical MVR/AVR     acetaminophen (TYLENOL) 325 MG tablet Take 325-650 mg by mouth every 6 hours as needed for mild pain     alendronate (FOSAMAX) 70 MG tablet Take 70 mg by mouth every 7 days     blood glucose monitoring (NO BRAND SPECIFIED) test strip Use to test blood sugar 2 times daily or as directed.     Calcium Carb-Cholecalciferol 600-400 MG-UNIT TABS      CANE, ANY MATERIAL      Cream Base (EMOLLIENT BASE) CREA      diltiazem (CARDIZEM) 30 MG tablet Take 1 tablet (30 mg) by mouth every 6 hours     enoxaparin ANTICOAGULANT (LOVENOX ANTICOAGULANT) 40 MG/0.4ML syringe Inject 0.4 mLs (40 mg) Subcutaneous 2 times daily Hold this injection the night BEFORE and the MORNING of surgery. (Patient not taking: Reported on 3/16/2021)     furosemide (LASIX) 40 MG tablet Take 0.5 tablets (20 mg) by mouth 2 times daily     lancets 28G MISC      lisinopril (PRINIVIL,ZESTRIL) 2.5 MG tablet Take 1 tablet (2.5 mg) by mouth daily     metoprolol (TOPROL-XL) 25 MG 24 hr tablet Take 1.5 tablets (37.5 mg) by mouth daily     Misc. Devices (ROLLER WALKER) MISC 2 Wheeled Walker for home use. For 12 weeks.     simvastatin (ZOCOR) 20 MG tablet Take 0.5 tablets (10 mg) by mouth At Bedtime     No current facility-administered medications for this visit.           Review of Systems:     CONSTITUTIONAL: negative for fever, chills, change in weight  INTEGUMENTARY/SKIN: no rash or obvious new lesions  ENT/MOUTH: no sore throat, new sinus pain or nasal drainage  RESP: see interval history  CV: negative for chest pain, palpitations or  peripheral edema  GI: no nausea, vomiting, change in stools  : no dysuria  MUSCULOSKELETAL: no myalgias, arthralgias  ENDOCRINE: blood sugars with adequate control  PSYCHIATRIC: mood stable  LYMPHATIC: no new lymphadenopathy  HEME: no bleeding or easy bruisability  NEURO: no numbness, weakness, headaches         Physical Exam:      A comprehensive physical examination is deferred due to [facility] (public health emergency) telephone visit restrictions.         Current Laboratory Data:   All laboratory and imaging data reviewed.             Previous Cardiology Imaging   No results found for this or any previous visit (from the past 8760 hour(s)).

## 2021-05-27 NOTE — TELEPHONE ENCOUNTER
"Called patient for assessment on lower dose of furosemide 20 mg daily.    Her weight this morning is 110.5 pounds. She states her legs are \"back to normal\". She denies shortness of breath, lightheadedness, dizziness, palpitations, lower extremity swelling or orthopnea.     She reports feeling better, especially since her cold symptoms are gone.   Encouraged to call with worsening symptoms of fluid retention.     Follow up rescheduled to 4/2/19.   "

## 2021-05-27 NOTE — TELEPHONE ENCOUNTER
Called patient with recommendations. She verbalized understanding and will call with any worsening symptoms with decreasing furosemide to 20 mg daily. She plans on follow up 3/28 with Marleny NP.    Medication list updated.

## 2021-05-27 NOTE — TELEPHONE ENCOUNTER
----- Message from Jairon Johnson sent at 4/4/2019 12:36 PM CDT -----  Contact: patient  General phone call:    Caller: Patient  Primary cardiologist: Didier  Detailed reason for call: Patient is scheduled for Echo CAILIN on 4/5 and would like to know if she needs to hold any of her medication prior to procedure.  New or active symptoms? No   Best phone number: [phone]  Best time to contact: anytime  Ok to leave a detailed message? Yes   Device? No     Additional Info:

## 2021-05-27 NOTE — PATIENT INSTRUCTIONS - HE
Chiquita Flores,    It was a pleasure to see you today at the [facility].     My recommendations after this visit include:  - You will be called with the results of your labs.    Marleny Phoenix CNP

## 2021-05-27 NOTE — TELEPHONE ENCOUNTER
Patient calling to report her weight is down to 111 pounds today and she is asking if she should continue taking furosemide 40 mg daily.    She was 118 pounds when last seen 3/21/19 and then went to:  3/22  114  3/23  113.5  3/24  112  3/25  111    Patient states lower extremity edema and shortness of breath have resolved.    She does not want to continue losing weight and is asking Marleny to please advise.  Follow up already scheduled 3/28/19 with Marleny KEN.  ===========    Marleny,  Please see patient concerns above. She does not want to continue losing weight.  Any new orders or recommendations?  Thank you,  Clarisse

## 2021-05-27 NOTE — TELEPHONE ENCOUNTER
Decrease lasix to 20 mg daily.  She should call if she notices any worsening symptoms this week. She should keep appointment with me on Thursday.

## 2021-05-27 NOTE — TELEPHONE ENCOUNTER
----- Message from Marleny Phoenix CNP sent at 3/28/2019  8:31 AM CDT -----  Can you please check in with Chiquita today since I had to cancel her appointment today?  I decreased lasix earlier this week and want to make sure she is doing ok on this lower dose.   Thanks

## 2021-05-29 NOTE — PATIENT INSTRUCTIONS - HE
Chiquita,    It was a pleasure to see you today at the [facility].     Please discuss your chronic mild anemia with Dr. Marcial when you see her next.     You will see Dr. Velásquez in five months after a new basic metabolic panel.     Please call us if you have any questions or concerns about your heart.      Bill Velásquez M.D.

## 2021-05-31 VITALS — BODY MASS INDEX: 19.09 KG/M2 | WEIGHT: 111.8 LBS | HEIGHT: 64 IN

## 2021-06-01 VITALS — BODY MASS INDEX: 18.95 KG/M2 | HEIGHT: 64 IN | WEIGHT: 111 LBS

## 2021-06-01 VITALS — WEIGHT: 116 LBS | HEIGHT: 64 IN | BODY MASS INDEX: 19.81 KG/M2

## 2021-06-02 VITALS — BODY MASS INDEX: 19.81 KG/M2 | HEIGHT: 64 IN | WEIGHT: 116 LBS

## 2021-06-02 VITALS — HEIGHT: 63 IN | BODY MASS INDEX: 19.37 KG/M2 | WEIGHT: 109.31 LBS

## 2021-06-02 VITALS — WEIGHT: 118 LBS | HEIGHT: 64 IN | BODY MASS INDEX: 20.14 KG/M2

## 2021-06-02 VITALS — WEIGHT: 113 LBS | HEIGHT: 64 IN | BODY MASS INDEX: 19.29 KG/M2

## 2021-06-03 VITALS — BODY MASS INDEX: 19.95 KG/M2 | HEIGHT: 63 IN | WEIGHT: 112.6 LBS

## 2021-06-03 VITALS
HEART RATE: 60 BPM | RESPIRATION RATE: 16 BRPM | HEIGHT: 63 IN | DIASTOLIC BLOOD PRESSURE: 60 MMHG | BODY MASS INDEX: 20.73 KG/M2 | WEIGHT: 117 LBS | SYSTOLIC BLOOD PRESSURE: 124 MMHG

## 2021-06-03 NOTE — PATIENT INSTRUCTIONS - HE
Chiquita,    It was a pleasure to see you today at [facility].    You will see Dr. Velásquez in one year after a new echocardiogram, Holter monitor, and blood tests.     Please call us if you have any questions or concerns about your heart.      Bill Velásquez M.D.

## 2021-06-04 VITALS
HEART RATE: 79 BPM | RESPIRATION RATE: 16 BRPM | HEIGHT: 63 IN | SYSTOLIC BLOOD PRESSURE: 123 MMHG | WEIGHT: 116 LBS | TEMPERATURE: 97.8 F | DIASTOLIC BLOOD PRESSURE: 66 MMHG | BODY MASS INDEX: 20.55 KG/M2

## 2021-06-04 VITALS
DIASTOLIC BLOOD PRESSURE: 82 MMHG | HEIGHT: 63 IN | SYSTOLIC BLOOD PRESSURE: 122 MMHG | WEIGHT: 120.2 LBS | BODY MASS INDEX: 21.3 KG/M2 | RESPIRATION RATE: 16 BRPM | HEART RATE: 100 BPM

## 2021-06-04 VITALS
HEART RATE: 78 BPM | OXYGEN SATURATION: 96 % | SYSTOLIC BLOOD PRESSURE: 118 MMHG | BODY MASS INDEX: 20.55 KG/M2 | DIASTOLIC BLOOD PRESSURE: 62 MMHG | WEIGHT: 116 LBS | HEIGHT: 63 IN | RESPIRATION RATE: 14 BRPM

## 2021-06-04 VITALS — HEIGHT: 63 IN | WEIGHT: 116 LBS | BODY MASS INDEX: 20.55 KG/M2

## 2021-06-04 NOTE — TELEPHONE ENCOUNTER
----- Message from Adrian Davenport sent at 12/16/2019 11:49 AM CST -----  Regarding: Question about a study letter  General phone call:    Caller:  Erik    Primary cardiologist: Dr Velásquez    Detailed reason for call: Pt received a letter about doing a study for AFIB - she wasn't sure what to make of it - asking for a call back (may want to know Dr Velásquez's opinion on it)    New or active symptoms? na    Best phone number: [phone]    Best time to contact: any  Ok to leave a detailed message? yes  Device? no    Additional Info:

## 2021-06-04 NOTE — TELEPHONE ENCOUNTER
Return call to patient - informed her that any questions about letter she rec'd regarding research study should be directed to research team - explained that Dr. Velásquez may not know she rec'd letter, but would leave it up to her on whether she would want to participate and support her decision - patient verbalized understanding and agreed to call # provided on letter to address any further questions/concerns related to study.  mg

## 2021-06-05 VITALS
SYSTOLIC BLOOD PRESSURE: 108 MMHG | DIASTOLIC BLOOD PRESSURE: 68 MMHG | HEART RATE: 108 BPM | BODY MASS INDEX: 18.61 KG/M2 | WEIGHT: 109 LBS | OXYGEN SATURATION: 98 % | HEIGHT: 64 IN

## 2021-06-05 VITALS — WEIGHT: 102 LBS | BODY MASS INDEX: 18.07 KG/M2 | HEIGHT: 63 IN

## 2021-06-05 VITALS
HEIGHT: 63 IN | RESPIRATION RATE: 18 BRPM | OXYGEN SATURATION: 99 % | SYSTOLIC BLOOD PRESSURE: 108 MMHG | HEART RATE: 96 BPM | DIASTOLIC BLOOD PRESSURE: 62 MMHG | WEIGHT: 102 LBS | BODY MASS INDEX: 18.07 KG/M2

## 2021-06-05 VITALS
BODY MASS INDEX: 18.07 KG/M2 | OXYGEN SATURATION: 99 % | HEIGHT: 63 IN | DIASTOLIC BLOOD PRESSURE: 70 MMHG | WEIGHT: 102 LBS | HEART RATE: 77 BPM | SYSTOLIC BLOOD PRESSURE: 110 MMHG

## 2021-06-05 VITALS
RESPIRATION RATE: 20 BRPM | HEART RATE: 104 BPM | DIASTOLIC BLOOD PRESSURE: 72 MMHG | OXYGEN SATURATION: 99 % | SYSTOLIC BLOOD PRESSURE: 118 MMHG

## 2021-06-05 VITALS
WEIGHT: 118 LBS | SYSTOLIC BLOOD PRESSURE: 108 MMHG | BODY MASS INDEX: 20.9 KG/M2 | OXYGEN SATURATION: 94 % | HEART RATE: 71 BPM | DIASTOLIC BLOOD PRESSURE: 58 MMHG

## 2021-06-05 VITALS — HEIGHT: 63 IN | WEIGHT: 115 LBS | BODY MASS INDEX: 20.38 KG/M2

## 2021-06-05 NOTE — TELEPHONE ENCOUNTER
Zestar Study Consent Visit    Study description: ECG and PPG Study: Zestar Study      Chiquita Flores a 74 y.o. female , was contacted by today to discuss participation in the Zestar study.     The patient called the Clinical Trials Office to inquire about study participation.  The consent form was reviewed with the patient.    The review of the study included:    Study purpose     Conflict of interest    Device description      Study visits    Risks of participation    Benefits (if any)    Alternatives    Voluntary participation    Confidentiality     Compensation/costs of participation    Study stipends    Injury and legal rights    The subject was queried in regards to her willingness to continue and come in for scheduled appointment. her questions were answered to her satisfaction.   The patient has given her preliminary agreement to volunteer to participate in the above noted study.     Plan: Chiquita Flores will come to [facility] on 1/20/2020 to continue consent process. If she continues to agrees to participate, the study visit will be done on the same day.  she was instructed to eat as usual before coming for study visit and take medications as usual too. she was encouraged to wear comfortable clothes and shoes to the study appointment.       Macarena Sparks, EPS

## 2021-06-10 NOTE — TELEPHONE ENCOUNTER
"Per 8-24-20 phone note:  Agree with plan, but if her PCP wants her to be seen in Heart Care sooner than I can see her, have her seen in RAC.   Jax Velásquez MD    Return call to patient who stated she had appt today with [facility] PCP - confirmed per visit note \"Shortness of breath with activities, and pleural effusion: We will repeat chest xrays today and labs. 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  mg    "

## 2021-06-10 NOTE — TELEPHONE ENCOUNTER
----- Message from Marcella Palacios sent at 7/31/2020  9:10 AM CDT -----      Caller: Patient  Primary cardiologist: Dr. Velásquez    Detailed reason for call: Patient is calling she stated that she fell on her knee and now her leg is a little swollen also she thinks her heart is beating a lot faster too. Please advise    Best phone number: [phone]  Best time to contact: today  Ok to leave a detailed message? yes  Device? no

## 2021-06-10 NOTE — TELEPHONE ENCOUNTER
Spoke with pt and she is shortness of breath with activity. When resting feels better. She is seeing her PCP on Thursday and will call if she get worse and needs RAC. If Worse she will also go to ED. Will update Dr. Velásquez.

## 2021-06-10 NOTE — TELEPHONE ENCOUNTER
----- Message from Korinaconnie Marte sent at 8/10/2020  9:54 AM CDT -----  General phone call:    Caller: Patient  Primary cardiologist: Didier  Detailed reason for call: Patient is having some issues with the weather and breathing, her heart has been pounding. shortness of breath on exertion for the last couple of days  New or active symptoms? shortness of breath on exertion  Best phone number: [phone]  Best time to contact: Anytime  Ok to leave a detailed message? Yes  Device? no    Additional Info: She was wanting an appointment

## 2021-06-10 NOTE — TELEPHONE ENCOUNTER
"Wellness Screening Tool  Symptom Screening:  Do you have one of the following NEW symptoms:    Fever (subjective or >100.0)?  No    A new cough?  No    Shortness of breath?  Yes . \"Fluid in my lungs\". Had CXR by PCP, whom is sending her to [place] to have the fluid removed. Pt unclear of total plan of care.     Chills? No     New loss of taste or smell? No     Generalized body aches? No     New persistent headache? No     New sore throat? No     Nausea, vomiting, or diarrhea?  No    Within the past 3 weeks, have you been exposed to someone with a known positive illness below:    COVID-19 (known or suspected)?  No    Chicken pox?  No    Mealses?  No    Pertussis?  No    Patient notified of visitor policy- They may have one person accompany them to their appointment, but they will need to wear a mask and will be screened upon arrival for symptoms: Yes  Pt informed to wear a mask: Yes  Pt notified if they develop any symptoms listed above, prior to their appointment, they are to call the clinic directly at [phone] for further instructions.  Yes  Patient's appointment status: Patient will be seen in clinic as scheduled on 8/14/20. GARETHRn      During patient discussion, informs writer that her PCP is sending her to [facility] this afternoon, \"to get fluid off my lungs\". Offered to cancel the appointment, pt declined. Stating that if she stays longer than just today she will call to cancel. GARETHRn       "

## 2021-06-10 NOTE — TELEPHONE ENCOUNTER
----- Message from Marcella Palacios sent at 8/13/2020  1:15 PM CDT -----      Caller: Patient  Primary cardiologist: Dr. Velásquez    Detailed reason for call: Patient saw primary doctor today and had an X-Ray if her lungs and it came back with her having a large amount of fluid in her lungs and was suggested that she go to the hospital to have that drained. Please advise since patient isn't sure.    Best phone number: [phone]  Best time to contact: today  Ok to leave a detailed message? yes  Device? No    Additional Info:

## 2021-06-10 NOTE — TELEPHONE ENCOUNTER
----- Message from Johanna Rubin sent at 8/24/2020  9:08 AM CDT -----  Regarding: [facility] PT / FOLLOW UP APPT  General phone call:    Caller: Chiquita Valencia     Primary cardiologist: [facility]     Detailed reason for call: Pt states she was recently hospitalized at [facility] for fluid in her left lung. Even though the fluid is out now, she still feels shortness of breath and a fast heart beat. Pt is requesting an in clinic follow up as soon as possible with [facility] - his first opening isn't until 9/24 at . Please advise, thank you.     New or active symptoms? Yes     Best phone number: [phone]    Best time to contact: Anytime     Ok to leave a detailed message? Yes     Device? No     Additional Info:

## 2021-06-10 NOTE — TELEPHONE ENCOUNTER
----- Message from Johanna Rubin sent at 8/27/2020  3:52 PM CDT -----  Regarding: [facility] PT / FOLLOW UP ON SOB  General phone call:    Caller: Pt,     Primary cardiologist: [facility]     Detailed reason for call: Pt states she was told to follow up with her pcp for her shortness of breath and her pcp recommended for her to be seen by [facility]. Pcp believes her shortness of breath is due to a problem in her left valve. Pt wants to update [facility]'s nurse of this and if she can be seen in clinic as soon as possible. - [facility]'s first available in clinic isn't until 9/25. Please advise.     Best phone number: [phone]    Best time to contact: Anytime     Ok to leave a detailed message? Yes     Device? No     Additional Info:

## 2021-06-10 NOTE — TELEPHONE ENCOUNTER
"Per Dr. Velásquez's 11-6-19 visit note \"Continue current cardiac medications; she will use furosemide prn only. Return to see Dr. Velásquez in one year after the testing noted above.\"    Return call to patient who stated she fell on her Rt knee 2wks ago (7-20-20) and was evaluated in urgent care 7-28-20 after knee became more swollen and bruised - CXR, u/s, labs were all ok - patient verbalized concerns that swelling in Rt leg has not improved, she feels more BLANCHARD and heart is beating harder - patient denies dizziness,cough or left leg edema - wt was 117.5# today and usual wt is 114#.    Instructed patient to take Lasix dose this am and contact PCP for f/u on Rt knee - informed her that update would also be forwarded to Dr. Velásquez for any additional recommendations - understanding verbalized.    Please review patient update and address whether patient should take multiple consecutive doses of Lasix for sx.  mg      "

## 2021-06-10 NOTE — TELEPHONE ENCOUNTER
"Wellness Screening Tool  Symptom Screening:  Do you have one of the following NEW symptoms:    Fever (subjective or >100.0)?  No    A new cough?  No    Shortness of breath?  Yes , \"fluid overload\"    Chills? No     New loss of taste or smell? No     Generalized body aches? No     New persistent headache? No     New sore throat? No     Nausea, vomiting, or diarrhea?  No    Within the past 3 weeks, have you been exposed to someone with a known positive illness below:    COVID-19 (known or suspected)?  No    Chicken pox?  No    Mealses?  No    Pertussis?  No    Patient notified of visitor policy- They may have one person accompany them to their appointment, but they will need to wear a mask and will be screened upon arrival for symptoms: No  Pt informed to wear a mask: Yes  Pt notified if they develop any symptoms listed above, prior to their appointment, they are to call the clinic directly at [phone] for further instructions.  Yes  Patient's appointment status: Patient will be seen in clinic as scheduled on 8/7/20. -darab       "

## 2021-06-10 NOTE — TELEPHONE ENCOUNTER
Noted patient no showed for 8-7-20 RAC appt which was recommended per 7-31-20 phone note.    Return call to patient who stated she did miss appt on Friday because she couldn't find how to get into [facility] due to construction - patient confirmed [facility] was preferred location and has had previous appts there - patient reported improved KUN after taking Lasix x 3 days per Dr. Velásquez's recommendations but her breathing has worsened the past couple of days.    Explained to patient that heat and humidity over the weekend could have contributed to worsening dyspnea and encouraged her to avoid going outside during times when humidity elevated - instructed her to schedule next available RAC appt and f/u with PCP during interim if appt is not in the next 2 days - patient verbalized understanding - call transf to mario  mg

## 2021-06-10 NOTE — TELEPHONE ENCOUNTER
Phone call to patient - informed her of Dr. Velásquez's response/recommendations - patient verbalized understanding that she can take Lasix 20mg x 3 days and agreed to sched next available [facility] appt - patient prefers [facility] location - call transf to sched.  mg

## 2021-06-10 NOTE — TELEPHONE ENCOUNTER
Return call to patient - informed her that Dr. Velásquez would support and agree with PCP's recommendations based on recent tests - explained that since her PCP is in [facility], he may recommend [facility] and she would be followed by their cardiologist if needed - patient verbalized understanding and agreed to call PCP to accept recs.      Noted patient scheduled for RAC appt with Dr. Álvarez 8-14-20 - will wait to see if pt admitted before cancelling appt - update forwarded to Dr. Velásquez.  mg

## 2021-06-11 NOTE — TELEPHONE ENCOUNTER
I spoke with Joanna by phone about her CT results; she wishes to have our [facility] team manage the recurrent pleural effusion as she does not have a pulmonary physician at [facility]. I have sent a message to colleagues in Pulmonary and CT Surgery to request their advice and assistance with considering pleurodesis.

## 2021-06-11 NOTE — TELEPHONE ENCOUNTER
----- Message from Marleny Phoenix CNP sent at 9/10/2020  7:58 AM CDT -----  Awais,    Please put orders in that Dr. Velásquez is requesting and I will discuss with patient when I see her.     Thanks   ----- Message -----  From: Jax Velásquez MD  Sent: 9/3/2020   8:54 PM CDT  To: Thai Mccabe (Erwin) MD Tang, #    Britany, no new orders. Tell her that the echo is stable.    Erwin, thanks for seeing her in RAC. I am away for two weeks, but note that fluoro and CAILIN of her prosthetic valves showed normal function of them last year. I agree with you that constriction is possible. 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    Marleny, you see her next week. Perhaps order a cardiac CT to look for pericardial thickening and if positive, ask Mudassar (he did her last cath) if he would to a right heart cath. It also appears that we should recheck her BNP, renal and hepatic profiles and hemoglobin. Please order those when you see her.    RUSSELL Baxter.    Thanks,     Bill Velásquez

## 2021-06-11 NOTE — TELEPHONE ENCOUNTER
Results and recommendation reviewed with patient. Patient verbalized understanding. She will stop lasix and start torsemide 40 mg in the morning and 20 mg in the afternoon.  Rx sent to patients preferred local pharmacy. Call transferred to  for f/u in HF clinic next week.

## 2021-06-11 NOTE — PATIENT INSTRUCTIONS - HE
Chiquita Flores,    It was a pleasure to see you today at the [facility].     My recommendations after this visit include:  - You will be called with the results of your labs.   - You are due to see Dr. Velásquez in November, please schedule.   - Please call my nurse Crystal if you have any concerns: [phone]    Marleny Phoenix, CNP

## 2021-06-11 NOTE — PATIENT INSTRUCTIONS - HE
Chiquita Flores,    It was a pleasure to see you today at the [facility].     My recommendations after this visit include:  - Please schedule cardiac CT as soon as possible.    - If your breathing worsens or you develop chest pain, go to the emergency room.   - You will be called with the results of your labs.      Marleny Phoenix, CNP

## 2021-06-11 NOTE — TELEPHONE ENCOUNTER
Please cancel patient's Friday follow up with Marleny per Dr. Velásquez.  Thank you!  -------------------------------------------------------------  Patient called my direct number to inform she had a telephone conversation with Dr. Velásquez and he has made new recommendations as in telephone note. Per Dr. Velásquez she does not need the appointment on Friday with Marleny.  Informed patient the appt will be cancelled. No further questions at this time.

## 2021-06-11 NOTE — TELEPHONE ENCOUNTER
Wellness Screening Tool  Symptom Screening:  Do you have one of the following NEW symptoms:    Fever (subjective or >100.0)?  No    A new cough?  No    Shortness of breath?  No     Chills? No     New loss of taste or smell? No     Generalized body aches? No     New persistent headache? No     New sore throat? No     Nausea, vomiting, or diarrhea?  No    Within the past 2 weeks, have you been exposed to someone with a known positive illness below:    COVID-19 (known or suspected)?  No    Chicken pox?  No    Mealses?  No    Pertussis?  No    Patient notified of visitor policy- They may have one person accompany them to their appointment, but they will need to wear a mask and will be screened upon arrival for symptoms: Yes  Pt informed to wear a mask: Yes  Pt notified if they develop any symptoms listed above, prior to their appointment, they are to call the clinic directly at [phone] for further instructions.  Yes  Patient's appointment status: Patient will be seen in clinic as scheduled on 9/11

## 2021-06-11 NOTE — TELEPHONE ENCOUNTER
Called Chiquita with results of her CT chest.    Per Dr. Patel: Please let Chiquita know I reviewed her full CT chest and there were no new findings compared to the last CT. I will discuss further at our appointment.   thx   AS       Patient had no further questions at this time.  Will discuss with Dr. Patel at next office visit.

## 2021-06-11 NOTE — TELEPHONE ENCOUNTER
Copied from 9-22-20 phone note:   9/22/20 3:09 PM   Note      I spoke with Joanna by phone about her CT results; she wishes to have our [facility] team manage the recurrent pleural effusion as she does not have a pulmonary physician at [facility]. I have sent a message to colleagues in Pulmonary and CT Surgery to request their advice and assistance with considering pleurodesis.  Jax Velásquez MD

## 2021-06-11 NOTE — TELEPHONE ENCOUNTER
----- Message from Marleny Phoenix CNP sent at 9/11/2020  2:45 PM CDT -----  Dr. Velásquez ordered labs today but he is out of the office. I saw patient in clinic today.  She is not responding to increased dose of lasix.  BNP elevated, edema, SOB.  Please have her stop lasix and start torsemide 40 mg in the morning and 20 mg in the afternoon.  Follow up with me (or Rika if I am not available) next week.     Thanks

## 2021-06-11 NOTE — TELEPHONE ENCOUNTER
----- Message from Marleny Phoenix CNP sent at 9/17/2020 10:34 AM CDT -----  Labs are stable and continues to retain fluid.  Please have her increase torsemide to 40 mg twice daily. Follow up with me in 1 week.

## 2021-06-11 NOTE — TELEPHONE ENCOUNTER
Wellness Screening Tool  Symptom Screening:  Do you have one of the following NEW symptoms:    Fever (subjective or >100.0)?  No    A new cough?  No    Shortness of breath?  Yes but symptom is ongoing and r/t heart condition    Chills? No     New loss of taste or smell? No     Generalized body aches? No     New persistent headache? No     New sore throat? No     Nausea, vomiting, or diarrhea?  No    Within the past 2 weeks, have you been exposed to someone with a known positive illness below:    COVID-19 (known or suspected)?  No    Chicken pox?  No    Mealses?  No    Pertussis?  No    Patient notified of visitor policy- They may have one person accompany them to their appointment, but they will need to wear a mask and will be screened upon arrival for symptoms:  Yes  Pt informed to wear a mask: Yes  Pt notified if they develop any symptoms listed above, prior to their appointment, they are to call the clinic directly at [phone] for further instructions.  Yes  Patient's appointment status: Tomorrow with BELEN

## 2021-06-11 NOTE — TELEPHONE ENCOUNTER
Return call from patient who confirmed Dr. Velásquez had contacted her with recent test results but questioned if recent CT evaluated her lungs and is concerned that chest CT ordered by Dr. Patel may not be covered by her insurance if it is a duplicate.    Explained to patient that Dr. Velásquez's test evaluated her heart and order was changed from chest CTA per radiologist recommendations (refer to 9-22-20 phone note) - informed her that new CT recommended by Dr. Patel also evaluates her lungs - encouraged patient to contact her insurance and/or Dr. Patel's team with concerns about duplicating tests and coverage - patient verbalized understanding.  mg

## 2021-06-11 NOTE — TELEPHONE ENCOUNTER
Orders placed per protocol - comments placed in 9-11-20 f/u appt with Marleny indicating need for lab draw at visit.  mg

## 2021-06-11 NOTE — TELEPHONE ENCOUNTER
----- Message from Jennifer Morgan MD sent at 9/22/2020  4:30 PM CDT -----  Regarding: RE: CT Coronary Procedure  Please look at the report.  The patient does not have CT evidence of constrictive pericarditis.  Tyrone Travis    ----- Message -----  From: Jax Velásquez MD  Sent: 9/22/2020  11:28 AM CDT  To: Jennifer Morgan MD, Britany Vaughn RN, #  Subject: RE: CT Coronary Procedure                        Ronit,    Your information is perfect.    I hope that they did not do the CT chest as the radiologist said it wouldn't help and he recommended a CT coronary protocol to be read by Dr. Thaddeus Morgan. My understanding is that the CT coronary protocol was done on Sept 18 with an over read by the radiologist already on the chart and the cardiology read by Dr. Morgan is pending.    WEI Velásquez MD    ----- Message -----  From: Ronit Wylie  Sent: 9/22/2020   9:02 AM CDT  To: Jax Velásquez MD  Subject: CT Coronary Procedure                            Good Morning Dr. Velásquez,       I have been working on a prior authorization for your patient Chiquita. I spoke with a nurse reviewer from the patients insurance company and they are unable to approve cpt code 19091 CTA CORONARY due to there already being an approval for a CTA Chest cpt 10108 that was ordered on 09/10/2020.     I apologize if missing the results for that scan, but I was unable to locate in the chart if that procedure was preformed. If it was preformed, the insurance company needs to know why another exam from CTA Coronary is needed due to it being in the same area. If the CTA Chest was not preformed, then I need to know if you want to proceed with the CTA coronary authroization and I will withdrawal the CTA chest and resubmit CTA coronary as a retro auth for approval.       I know this was a lot of information, please let me know if I need to clarify anything.     Thank you so much for your time,     Ronit BARKLEY  Financial Securing Department

## 2021-06-11 NOTE — TELEPHONE ENCOUNTER
Patient called my direct number for results and recommendations. She verbalized understanding and will increase torsemide to 40 mg twice daily. Call transferred to scheduling to arrange follow up next week with Marleny. Patient will call in the interim with questions or concerns.  Medication list updated.

## 2021-06-11 NOTE — TELEPHONE ENCOUNTER
----- Message from Johanna Rubin sent at 9/23/2020  9:26 AM CDT -----  Regarding: [facility] PT / QUESTION REGARDING CT SCAN  General phone call:    Caller: Chiquita Valencia     Primary cardiologist: DAMI     Detailed reason for call: Pt is requesting for a call back to discuss her CT scan done on 9/18 as well as the second CT scan that [facility] ordered for her. Please return call.     Best phone number: [phone]    Best time to contact: Anytime     Ok to leave a detailed message? Yes     Device? No     Additional Info:

## 2021-06-11 NOTE — TELEPHONE ENCOUNTER
Returned VM left on my direct number and left detailed message wt my direct number for return call for results and recommendations.

## 2021-06-11 NOTE — PATIENT INSTRUCTIONS - HE
Chiquita Flores,    It was a pleasure to see you today at the [facility].     My recommendations after this visit include:    Increase furosemide to 40 twice  aday  Echo  See chf np in 1 week    KARISSA Beckwith MD, FACC, BHARATI

## 2021-06-12 NOTE — PROGRESS NOTES
Called to remind Chiquita she need  to have labs drawn at [facility] the day of her thoracentesis. Questions answered and number given to call with any questions.

## 2021-06-12 NOTE — PROGRESS NOTES
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 The pleural fluid studies and CT scan was reviewed with Joanna and her sister Edith. The fluid appeared to be a borderline exudate in August was modestly elevated LDH. Cytology was negative for malignancy but only 1cc was submitted for cytologic analysis so the evaluation for malignancy was very limited.  The effusion could be due to cardiac disease (although she is on high doses of diuretics already) or she may have a trapped lung with pneumothorax ex vacuo leading to chronic recurrent effusion.     Discussed options for management for recurrent pleural effusions. She appears to be a poor surgical candidate - appears extremely frail, cachectic and with significant cardiac disease I don't think she'd do well with a thoracotomy. Thoracoscopy with pleurodesis might be in option. She is going to meet with our thoracic surgeon, Dr. Valdez, in a few weeks to discuss this.     In the meantime for immediate relief I recommend another thoracentesis and we will send for cytology again as I think malignancy is still possible.    Recommendations:  - US thoracentesis ordered - will need to hold coumadin and bridge her with LMWH. Will discuss with our nursing staff and radiology  - send for culture/gram stain, cell count, LDH, total protein, pH, glucose and cytology  - check serum LDH and total protein  - meet with Dr. Valdez on 10/22 to discuss surgical options.  - repeat CXR prior to next visit.     Follow up in 1 month with CXR.    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 Velásquez.  She reveloped right pleural effusion in 2015 after her TV surgery. This was drained and fluid analysis was unremarkable, no malignancy. She was hospitalized this past Aug 2020 at [place] with recurrent dyspnea. She underwent left thoracentesis, 800cc serous fluid removed, 79% lymphocytes, cytology negative, borderline exudate by LDH criteria. Seen by [facility] and fluid was thought to be benign.  She comes into clinic reporting severe dyspnea on exertion. She is very limited by her breathing. She can barely climb 1 flight of stairs. She appears very frail and thin.  Denies chest pain. Has chronic lower extremity edema L > R. Takes high doses of torsemide and does not want to increase diuretics. Also takes potassium which she despises doing.   No pleuritic chest pain, cough, chest pressure, fevers or chills.     ROS:  A 12-system review was obtained and was negative with the exception of the symptoms endorsed in the history of present illness.    PMH:  Past Medical History:   Diagnosis Date     Adult hypothyroidism 5/8/2009     Ascites 2015    due to hepatic congestion from severe TR     Bilateral pleural effusion 10/19/2015    left was drained at [facility] per Dr. Celeste Gar; right not drained as right lung was \"trapped\" per pulmonary consultant there.     Cataract 11/3/2011     Cerebral infarct (H) 5/13/2015     Closed femur fracture (H) 2015     Dyslipidemia, goal LDL below 100 2017     History of CVA (cerebrovascular accident) 12/5/2019     History of right heart failure 2015    due to severe TR as a result of rheumatic valve disease     History of transfusion      Nonocclusive coronary atherosclerosis of native coronary artery 2015 " "   minimal disease per angio report of Dr. Elias     Permanent Atrial Fibrillation, valvular, as a result of rheumatic heart disease      Rheumatic valvular disease      Secondary Pulmonary Hypertension     mean pulmonary artery pressure was 28 mmHg at right heart catheterization performed in  by Dr. Vidal Elias; she has a history of rheumatic valvular heart disease with aortic and mitral valve mechanical prostheses implanted in  at [facility].      Tricuspid Regurgitation, now SP tricuspid valve replacement      Type 2 Diabetes Mellitus 2015    controlled with diet       PSH:  Past Surgical History:   Procedure Laterality Date     MECHANICAL AORTIC AND MITRAL VALVE REPLACEMENT  1985    #21 [address] Gurwinder aortic and #25 [employer]. Gurwinder mitral done by a [facility] surgeon at [facility]     VA PARTIAL HIP REPLACEMENT      ball prosthesis to treat hip fracture     TRICUSPID VALVE REPLACEMENT  2015    29 mm St. Gurwinder Epic tissue valve by Dr. Neo Teague at [facility]       Allergies:  No Known Allergies    Family HX:  Family History   Problem Relation Age of Onset     Sudden death Father 55        no autopsy     Alzheimer's disease Sister      Other Sister          from burn injury while playing with matches     Other Mother         \"old age\", fell two months before her death and  in a nursing home     Osteoporosis Sister         living in [facility]     Arthritis Sister         lives in an apartment     No Medical Problems Sister      No Medical Problems Sister         she lives with [facility]       Social Hx:  Social History     Socioeconomic History     Marital status: Single     Spouse name: Not on file     Number of children: 0     Years of education: Not on file     Highest education level: Not on file   Occupational History     Occupation: book keeping     Employer: RETIRED     Comment: retired at 64   Social Needs     Financial resource strain: Not on file "     Food insecurity     Worry: Not on file     Inability: Not on file     Transportation needs     Medical: Not on file     Non-medical: Not on file   Tobacco Use     Smoking status: Never Smoker     Smokeless tobacco: Never Used   Substance and Sexual Activity     Alcohol use: No     Drug use: No     Sexual activity: Never   Lifestyle     Physical activity     Days per week: Not on file     Minutes per session: Not on file     Stress: Not on file   Relationships     Social connections     Talks on phone: Not on file     Gets together: Not on file     Attends Oriental orthodox service: Not on file     Active member of club or organization: Not on file     Attends meetings of clubs or organizations: Not on file     Relationship status: Not on file     Intimate partner violence     Fear of current or ex partner: Not on file     Emotionally abused: Not on file     Physically abused: Not on file     Forced sexual activity: Not on file   Other Topics Concern     Not on file   Social History Narrative    Lives with her youngest sister, Edith, in a home they jointly own.       Current Meds:  Current Outpatient Medications   Medication Sig Dispense Refill     alendronate (FOSAMAX) 70 MG tablet Take 1 tablet by mouth once a week.       amoxicillin (AMOXIL) 500 MG capsule Prior to the Dentist       aspirin 81 mg chewable tablet Chew 81 mg daily.       atorvastatin (LIPITOR) 10 MG tablet Take 1 tablet (10 mg total) by mouth daily. 90 tablet 3     levothyroxine (SYNTHROID, LEVOTHROID) 112 MCG tablet Take 1 tablet by mouth daily.       metoprolol succinate (TOPROL-XL) 25 MG TAKE 2 TABLETS EVERY  tablet 1     torsemide (DEMADEX) 20 MG tablet Take 2 tablets (40 mg total) by mouth 2 (two) times a day at 9am and 6pm. torsemide 40 mg (2 tablets) in the morning and 20 mg (1 tablet) in the afternoon. 180 tablet 1     warfarin (COUMADIN) 5 MG tablet Take 1 tabet by mouth 3 days per week and 1.5 tablets 4 days per week. Adjust dose based  "on INR results as directed.       No current facility-administered medications for this visit.        Physical Exam:  Ht 5' 3.5\" (1.613 m)   Wt 109 lb (49.4 kg)   BMI 19.01 kg/m    Gen: awake, alert, oriented, no distress  HEENT: nasal turbinates are unremarkable, no oropharyngeal lesions, no cervical or supraclavicular lymphadenopathy  CV: RRR, no M/G/R  Resp: right lung clear. Diminished at left base with +dullness to percussion.   Abd: soft, nontender, no palpable organomegaly  Skin: no apparent rashes  Ext: no cyanosis, clubbing or edema  Neuro: alert, nonfocal    Labs:  Reviewed    Chem panel OK  hgb 10.0    Pleural fluid from [facility] Aug 2020  79% lymphs, 849 total wbc  Protein 2.8    Glucose 148  Cytology neg for malignancy, but limited by low cell count (only 1cc submitted)    Pleural fluid 2015  53% lymphs, 25% mono  , protein 2.7  Neg for malignancy    Echo Sept 2020    Left ventricle ejection fraction is normal. The calculated left ventricular ejection fraction is 57%.    Right ventricle not well visualized. The systolic function is moderately reduced.    Aortic Valve: There is a bileaflet tilting disc mechanical valve present. There is no aortic insufficiency present. Moderate systolic gradient noted.    Mitral Valve: There is a bileaflet mechanical mitral valve prosthesis. Moderate diastolic gradient noted -possible stenosis    Tricuspid Valve: There is a bioprosthetic valve present. It appears to be functioning normally.    When compared to the previous study dated 4/5/2019, aortic prosthetic gradient has decreased significantly. The mitral diastolic gradient is mildly increased         Imaging studies:  EXAM: CT CHEST WO CONTRAST  LOCATION: [facility]  DATE/TIME: 9/30/2020 12:40 PM     INDICATION: Recurrent left pleural effusion.  COMPARISON: Cardiac CT 9/18/2020.  TECHNIQUE: CT chest without IV contrast. Multiplanar reformats were obtained. Dose reduction techniques " were used.  CONTRAST: None.     FINDINGS:   LUNGS AND PLEURA: Moderate-sized, freely layering left pleural effusion has not significantly changed in size when compared to previous. Underlying consolidation/atelectasis in the left lower lobe and inferior lingula. Mild subsegmental atelectasis or   scarring at the right lung base, with chronic right-sided pleural thickening and trace right pleural effusion.     MEDIASTINUM/AXILLAE: Prior CABG, and prosthetic aortic, tricuspid, and mitral valves. Marked cardiomegaly particularly the right atrium. No lymphadenopathy. No pericardial effusion.     UPPER ABDOMEN: The IVC and hepatic veins are distended.     MUSCULOSKELETAL: Unremarkable.     IMPRESSION:   1.  Moderate-sized left pleural effusion has not significantly changed from 9/18/2020.    PFT's  n/a

## 2021-06-13 NOTE — TELEPHONE ENCOUNTER
Return call to patient who stated she was recently discharged on 10-31-20 and instructed to take Torsemide 60mg two times a day and started on Spironolactone 25mg two times a day and her wt is now down to 99# - usual wt around 113-115# - patient denies dizziness and does not have home BP monitor.    Confirmed patient has not seen Dr. Velásquez since 11-6-19 and has f/u with him on 11-25-20 - patient was last seen by HF NP 9-17-20 and was taking Torsemide 40mg in am, 20mg in pm.    Patient also reported that she does have an appt with PCP tomorrow am and just wanted Dr. Velásquez to know that she wants to stop larger dose of Torsemide.    Informed patient that PCP should address need for dose changes and f/u labs at appt tomorrow since Dr. Velásquez has not been directing her care since last year - reassured her that PCP can determine need for sooner f/u with HF NP at visit tomorrow and update would be forwarded as FYI - patient verbalized understanding.    Please review patient update after recent hospitalization and address whether patient should f/u with HF NP prior to yrly f/u with you on 11-25-20.  mg

## 2021-06-13 NOTE — TELEPHONE ENCOUNTER
Message rec'd 11-10-20 @ 1056:        Let's wait and see what her PCP says regarding earlier follow up with the HF NP.     Jax Lima MD

## 2021-06-13 NOTE — TELEPHONE ENCOUNTER
----- Message from Marcella Palacios sent at 11/10/2020  9:06 AM CST -----      Caller: Patient  Primary cardiologist: Dr. Velásquez    Detailed reason for call: Patient stated that she is now 99lbs and she is discontinuing the high dose of water pills    Best phone number: [phone]  Best time to contact: today  Ok to leave a detailed message? yes  Device? No    Additional Info:

## 2021-06-13 NOTE — PROGRESS NOTES
"THORACIC SURGERY OFFICE NEW PATIENT VISIT      Dear Dr. Tarango,    I saw Ms. Flores at Dr. Patel s request in consultation for the evaluation and treatment of a recurrent LEFT pleural effusion, possibly of cardiac origin.     HPI  Ms. Chiquita Flores is a 75 y.o. year-old female with a long-standing left pleural effusion that has been tapped before and recurs despite maximum medical therapy. Pleural fluid analysis was negative for malignancy. She is moderately symptomatic, and she is interested to find a longer-lasting solution.    Tests   Echo (10/20/2020): moderate stenosis of aortic and mitral prosthetic valves with moderate biatrial enlargement, normally functioning tricuspid prosthetic valve.    CT chest (9/30/2020): moderate-sized free layering LEFT effusion    Pleural fluid (10/20/2020): transudate, cytology negative     PMH    Past Medical History:   Diagnosis Date     Adult hypothyroidism 05/08/2009     Ascites 2015    due to hepatic congestion from severe TR     Bilateral pleural effusion 10/19/2015    left was drained at [facility] per Dr. Celeste Gar; right not drained as right lung was \"trapped\" per pulmonary consultant there.     Cataract 11/03/2011     Cerebral infarct (H) 05/13/2015     Closed femur fracture (H) 2015     BLANCHARD (dyspnea on exertion)      Dyslipidemia, goal LDL below 100 2017     History of CVA (cerebrovascular accident) 12/05/2019     History of right heart failure 2015    due to severe TR as a result of rheumatic valve disease     History of transfusion      Nonocclusive coronary atherosclerosis of native coronary artery 2015    minimal disease per angio report of Dr. Elias     Permanent Atrial Fibrillation, valvular, as a result of rheumatic heart disease 1985     Recurrent left pleural effusion      Rheumatic valvular disease 1985     Secondary Pulmonary Hypertension     mean pulmonary artery pressure was 28 mmHg at right heart catheterization performed in 2015 by Dr. Vidal Elias; she " "has a history of rheumatic valvular heart disease with aortic and mitral valve mechanical prostheses implanted in 1985 at [facility].      Tricuspid Regurgitation, now SP tricuspid valve replacement 2011     Type 2 Diabetes Mellitus 2015    controlled with diet      Past Surgical History:   Procedure Laterality Date     MECHANICAL AORTIC AND MITRAL VALVE REPLACEMENT  02/08/1985    #21 [employer]. Gurwinder aortic and #25 [employer]. Gurwinder mitral done by a [facility] surgeon at [facility]     MI PARTIAL HIP REPLACEMENT  2013    ball prosthesis to treat hip fracture     TRICUSPID VALVE REPLACEMENT  9/1/2015    29 mm St. Gurwinder Epic tissue valve by Dr. Neo Teague at [facility] THORACENTESIS  10/20/2020         ETOH negative  TOB negative    Physical examination  /70   Pulse 77   Ht 5' 3\" (1.6 m)   Wt 102 lb (46.3 kg)   SpO2 99%   Breastfeeding No   BMI 18.07 kg/m      Ms. Flores appears pale and frail.     From a personal perspective, she lives with her sister, Edith.      IMPRESSION   1. Pleural effusion        75 y.o. year-old female with a recurrent LEFT pleural effusion, possibly of cardiac origin.    PLAN  I spent a total of 30 minutes with Ms. Flores, more than 50% of which were spent in counseling, coordination of care, and face-to-face time. I reviewed the plan as follows:    Ms. Flores has a recurrent pleural effusion refractory to medical management, and in my opinion, she would be best served with an indwelling pleural catheter.    Indwelling pleural catheter: I reviewed the indications, risks, and benefits of an indwelling pleural catheter with Ms. Flores. She had all her questions answered and wishes to proceed. I also discussed her case with Dr. Avendaño, who can place and manage an indwelling pleural catheter.    They had all their questions answered and were in agreement with the plan.  I appreciate the opportunity to participate in the care of your patient and will keep you updated.    Sincerely,    "

## 2021-06-13 NOTE — TELEPHONE ENCOUNTER
Called Chiquita to let her know her CXR  is stable. There is persistent pleural effusion on the left. According to Dr. Valdez's last office note, he was going to arrange for Pleurx catheter placement along with Dr. Avendaño will check with dr. Valdez to see when to arrange.Chiquita would like to have the procedure done.

## 2021-06-13 NOTE — PATIENT INSTRUCTIONS - HE
Chiquita,    It was a pleasure to see you today at [facility].    I agree with you that you do not need to be on aspirin while you are on warfarin; please stop it.    You will see Dr. Velásquez in four months, hopefully at our new [place] office.     Please call us if you have any questions or concerns about your heart.      Bill Velásquez M.D.  [facility]

## 2021-06-13 NOTE — PROGRESS NOTES
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 She was hospitalized last month and had a thoracentesis. The etiology for the recurrent exudative effusion is unclear but may be related to prior cardiothoracic surgical procedure vs. Chronic valvular disease with ongoing congestive heart failure (although unilateral effusion would be unusual for this). Cytology has been negative for cancer the last two occasions.    I am not sure how much the effusion is contributing to her dyspnea. She does seem to get immediate relief from the thoracenteses. She does have a lot of cardiac disease which may be contributing. The lung may be trapped at this point and the fluid will continue to reaccumulate. I think she is a poor surgical candidate given her extensive cardiac issues, poor nutritional status and frailty. She met with our thoracic surgeon yesterday and it appears that placement of a tunneled pleural cathter was discussed. This would be a reasonable approach and may lead to auto-pleurodesis in up to 2/3 of cases. It sounds like Joanna is interested in this option.     Recommendations:  - will discuss tunneled pleural catheter with Dr. Valdez  - follow up with me in 2-3 months to check in.    All questions answered.      William Patel MD (Avi)  [facility] Pulmonary & Critical Care  Pager [phone]  Clinic [phone]    CCx: followup recurrent pleural effusion    HPI: Interim history: I last saw Chiquita on 10/12/2020. Since that time she was admitted to [facility] for CHF exacerbation and shortness of breath. She underwent thoracentesis while inpatient. Results were negative for cancer, lymphocytic exudate as previously. She felt improved after  "the thoracentesis. 900cc of brownish colored fluid was removed.  Today, she reports she's having recurrent shortness of breath again but not as bad as when I last saw her. Denies chest pain or chest pressure.  She met with Dr. Valdez yesterday - I am unable to see his progress note. PleuRx catheter was discussed and it appears she is in favor of moving forward with this.     ROS:  A 12-system review was obtained and was negative with the exception of the symptoms endorsed in the history of present illness.    PMH:  Past Medical History:   Diagnosis Date     Adult hypothyroidism 05/08/2009     Ascites 2015    due to hepatic congestion from severe TR     Bilateral pleural effusion 10/19/2015    left was drained at [facility] per Dr. Celeste Gar; right not drained as right lung was \"trapped\" per pulmonary consultant there.     Cataract 11/03/2011     Cerebral infarct (H) 05/13/2015     Closed femur fracture (H) 2015     BLANCHARD (dyspnea on exertion)      Dyslipidemia, goal LDL below 100 2017     History of CVA (cerebrovascular accident) 12/05/2019     History of right heart failure 2015    due to severe TR as a result of rheumatic valve disease     History of transfusion      Nonocclusive coronary atherosclerosis of native coronary artery 2015    minimal disease per angio report of Dr. Elias     Permanent Atrial Fibrillation, valvular, as a result of rheumatic heart disease 1985     Recurrent left pleural effusion      Rheumatic valvular disease 1985     Secondary Pulmonary Hypertension     mean pulmonary artery pressure was 28 mmHg at right heart catheterization performed in 2015 by Dr. Vidal Elias; she has a history of rheumatic valvular heart disease with aortic and mitral valve mechanical prostheses implanted in 1985 at [facility].      Tricuspid Regurgitation, now SP tricuspid valve replacement 2011     Type 2 Diabetes Mellitus 2015    controlled with diet       PSH:  Past Surgical History:   Procedure Laterality " "Date     MECHANICAL AORTIC AND MITRAL VALVE REPLACEMENT  1985    #21 [address] aortic and #25 [address] [employer] mitral done by a [facility] surgeon at [facility]     PA PARTIAL HIP REPLACEMENT  2013    ball prosthesis to treat hip fracture     TRICUSPID VALVE REPLACEMENT  2015    29 mm [employer] Epic tissue valve by Dr. Neo Teague at [facility]     US THORACENTESIS  10/20/2020       Allergies:  No Known Allergies    Family HX:  Family History   Problem Relation Age of Onset     Sudden death Father 55        no autopsy     Alzheimer's disease Sister      Other Sister          from burn injury while playing with matches     Other Mother         \"old age\", fell two months before her death and  in a nursing home     Osteoporosis Sister         living in [facility]     Arthritis Sister         lives in an apartment     No Medical Problems Sister      No Medical Problems Sister         she lives with Chiquita       Social Hx:  Social History     Socioeconomic History     Marital status: Single     Spouse name: Not on file     Number of children: 0     Years of education: Not on file     Highest education level: Not on file   Occupational History     Occupation: book keeping     Employer: RETIRED     Comment: retired at 64   Social Needs     Financial resource strain: Not on file     Food insecurity     Worry: Not on file     Inability: Not on file     Transportation needs     Medical: Not on file     Non-medical: Not on file   Tobacco Use     Smoking status: Never Smoker     Smokeless tobacco: Never Used   Substance and Sexual Activity     Alcohol use: No     Drug use: No     Sexual activity: Never   Lifestyle     Physical activity     Days per week: Not on file     Minutes per session: Not on file     Stress: Not on file   Relationships     Social connections     Talks on phone: Not on file     Gets together: Not on file     Attends Jain service: Not on file     Active member of club or " "organization: Not on file     Attends meetings of clubs or organizations: Not on file     Relationship status: Not on file     Intimate partner violence     Fear of current or ex partner: Not on file     Emotionally abused: Not on file     Physically abused: Not on file     Forced sexual activity: Not on file   Other Topics Concern     Not on file   Social History Narrative    Lives with her youngest sister, Edith, in a home they jointly own.       Current Meds:  Current Outpatient Medications   Medication Sig Dispense Refill     alendronate (FOSAMAX) 70 MG tablet Take 1 tablet by mouth once a week.       amoxicillin (AMOXIL) 500 MG capsule Prior to the Dentist       aspirin 81 mg chewable tablet Chew 81 mg daily.       atorvastatin (LIPITOR) 10 MG tablet Take 1 tablet (10 mg total) by mouth daily. 90 tablet 3     cephalexin (KEFLEX) 500 MG capsule Take 500 mg by mouth.       levothyroxine (SYNTHROID, LEVOTHROID) 112 MCG tablet Take 1 tablet by mouth daily.       metoprolol succinate (TOPROL-XL) 25 MG Take 3 tablets (75 mg total) by mouth daily. (Patient taking differently: Take 50 mg by mouth daily. ) 30 tablet 0     mirtazapine (REMERON) 7.5 MG tablet Take 1 tablet (7.5 mg total) by mouth at bedtime. 30 tablet 0     polyethylene glycol (MIRALAX) 17 gram packet Take 1 packet (17 g total) by mouth daily. Hold for loose stools 30 packet 0     spironolactone (ALDACTONE) 25 MG tablet Take 1 tablet (25 mg total) by mouth 2 (two) times a day at 9am and 6pm. 30 tablet 0     torsemide (DEMADEX) 20 MG tablet Take 3 tablets (60 mg total) by mouth 2 (two) times a day at 9am and 6pm. 60 tablet 0     warfarin (COUMADIN) 5 MG tablet Take 7.5 mg by mouth Monday, Wednesday, Friday and 5 mg all other days. Adjust dose based on INR results as directed.       No current facility-administered medications for this visit.        Physical Exam:  /62   Pulse 96   Resp 18   Ht 5' 3\" (1.6 m)   Wt 102 lb (46.3 kg)   SpO2 99%   " BMI 18.07 kg/m    Gen: awake, alert, oriented, no distress  HEENT: nasal turbinates are unremarkable, no oropharyngeal lesions, no cervical or supraclavicular lymphadenopathy  CV: RRR, no M/G/R  Resp: slightly diminished on the left with dullness to percussion.   Abd: soft, nontender, no palpable organomegaly  Skin: no apparent rashes  Ext: no cyanosis, clubbing or edema  Neuro: alert, nonfocal    Labs:  Reviewed    Pleural fluid 10/20/2020  Wbc 82, 71% lymphs    Protein 1.7  PH 8.0  Glucose 141  Cytology neg for cancer  Micro neg    Previous labs    Chem panel OK  hgb 10.0     Pleural fluid from united Aug 2020  79% lymphs, 849 total wbc  Protein 2.8    Glucose 148  Cytology neg for malignancy, but limited by low cell count (only 1cc submitted)     Pleural fluid 2015  53% lymphs, 25% mono  , protein 2.7  Neg for malignancy     Echo Sept 2020    Left ventricle ejection fraction is normal. The calculated left ventricular ejection fraction is 57%.    Right ventricle not well visualized. The systolic function is moderately reduced.    Aortic Valve: There is a bileaflet tilting disc mechanical valve present. There is no aortic insufficiency present. Moderate systolic gradient noted.    Mitral Valve: There is a bileaflet mechanical mitral valve prosthesis. Moderate diastolic gradient noted -possible stenosis    Tricuspid Valve: There is a bioprosthetic valve present. It appears to be functioning normally.    When compared to the previous study dated 4/5/2019, aortic prosthetic gradient has decreased significantly. The mitral diastolic gradient is mildly increased    Limited echo OCt 2020    Technical Quality: limited visualization    Left ventricle ejection fraction is normal. The estimated left ventricular ejection fraction is 65%.    Normal right ventricular size.    Aortic Valve: Aortic valve not well visualized. There is a mechanical valve present. There is no aortic insufficiency present.  Moderate systolic gradient presents-possibly stenotic.    Mitral Valve: There is a bileaflet mechanical mitral valve prosthesis. Moderate diastolic gradient present-possibly stenotic. There is severe calcification of the anterior leaflet subvalvular apparatus. There is severe calcification of the posterior leaflet subvalvular apparatus.    Tricuspid Valve: There is a bioprosthetic valve present. It appears to be functioning normally.    When compared to the previous study dated 9/3/2020, no significant change.    Imaging studies:  CT chest 9/30/2020  IMPRESSION:   1.  Moderate-sized left pleural effusion has not significantly changed from 9/18/2020.    EXAM: XR CHEST 2 VIEWS  LOCATION: [facility]  DATE/TIME: 10/22/2020 7:52 AM     INDICATION: Recurrent pleural effusion.  Thoracocentesis on 10/21, still complaining of shortness of breath.  Please compare  COMPARISON: PA and lateral views of the chest 10/19/2020 and CT of the chest without contrast 09/30/2020     IMPRESSION:      No discernible change in size of left pleural effusion compared to 10/19/2020 which occupies the lower one half the left hemithorax. Associated compressive atelectasis of the majority of the left lower lobe. No pneumothorax.     Organized right posterior and lateral basal pleural fluid and thickening have not increased and remain small in size.     There is a new subsegmental sized opacity within the right lower lobe superior segment compared to 10/19/2020 could represent new focal lung inflammation or edema.     The cardiac silhouette remains enlarged with conspicuous right and left atrial enlargement. Unchanged slight shift of the heart and mediastinal structures towards the right indicative of chronic right lower lung volume loss. Extensive calcification of   the mitral valve into the basilar left ventricle. There are are mitral and tricuspid valvular prostheses.    Pulmonary Function Testing  n/a

## 2021-06-15 NOTE — TELEPHONE ENCOUNTER
----- Message from Crystal Harding RN sent at 3/4/2021 11:44 AM CST -----    ----- Message -----  From: Wanda Castro  Sent: 3/4/2021  10:35 AM CST  To: Crystal Harding RN    General phone call:  PATIENT IS PRETTY WEAK THESE DAYS, YESTERDAY WAS SO WEAK COULD NOT WALK ACROSS THE ROOM. PATIENT IS USUALLY SHORT OF BREATH, CAN'T TAKE A DEEP BREATH. MISSED YESTERDAYS APPOINTMENT.   PLEASE CALL  Caller: YAZ RAMOS  Primary cardiologist: SIN GONZALES  Detailed reason for call: SEE ABOVE  New or active symptoms? SEE ABOVE  Best phone number: [phone]  Best time to contact: ANY TIME  Ok to leave a detailed message? YES  Device? NO    Additional Info:

## 2021-06-15 NOTE — TELEPHONE ENCOUNTER
Phone call to patient - informed her of Dr. Velásquez's response to recent visit with Dr. Patel - patient confirmed that her breathing has improved since 12-16-20 procedure despite sounding shortness of breath during call - patient reported that she has been able to do more ADL's also - patient offered no concerns at this time but agreed to sched f/u with either Dr. Velásquez or HF NP in next wks and will await call back from sched.    Message sent to sched with updated f/u plan.  mg

## 2021-06-15 NOTE — TELEPHONE ENCOUNTER
Edith was contacted and asked to reach out to Joanna's PCP re: her current symptoms due to inability to schedule an appt with our clinic due to insurance.

## 2021-06-15 NOTE — TELEPHONE ENCOUNTER
----- Message from Jax Velásquez MD sent at 2/16/2021 12:40 PM CST -----  Regarding: check with her to see if she wishes to be seen before April Mo,    Dr. Patel's note suggested follow up with me. She is due to see me in April, but if she is not much improved after the pleural catheter she could see me or Marleny in the next two weeks.    Please give her a call, thanks!    S    ----- Message -----  From: William Patel MD  Sent: 2/16/2021  11:46 AM CST  To: Jax Velásquez MD, Micheal Tarango MD, #

## 2021-06-15 NOTE — PROGRESS NOTES
Chiquita Flores is a 76 y.o. female who is being evaluated via a billable telephone visit.      What phone number would you like to be contacted at? [phone]  How would you like to obtain your AVS? AVS Preference: Meerat.    CC: pleural effusion follow up    Interim history: I last saw carson on 11/20/20. Since that time, she had a PleuRx placed at the . She is following with Dr. Marino Gottlieb in that regard.  Today, she reports she's doing well.  She is draining about 450-550 mL per day. There are occasional air bubbles toward the end. The fluid is reddish-brown colored.   No pain when it drains. her sister (present on the call) thinks the PleuRx has been helpful for her breathing. She appears to have less shortness of breath.    Studies  Labs:  Reviewed     Pleural fluid 10/20/2020  Wbc 82, 71% lymphs    Protein 1.7  PH 8.0  Glucose 141  Cytology neg for cancer  Micro neg     Previous labs   -> 631 on 10/29/2020  Chem panel, Na 128 on 12/26/20.  hgb 10.3 on 10/30/20.   Albumin 3.3 on 10/20/20     Pleural fluid from united Aug 2020  79% lymphs, 849 total wbc  Protein 2.8    Glucose 148  Cytology neg for malignancy, but limited by low cell count (only 1cc submitted)     Pleural fluid 2015  53% lymphs, 25% mono  , protein 2.7  Neg for malignancy     Echo Sept 2020    Left ventricle ejection fraction is normal. The calculated left ventricular ejection fraction is 57%.    Right ventricle not well visualized. The systolic function is moderately reduced.    Aortic Valve: There is a bileaflet tilting disc mechanical valve present. There is no aortic insufficiency present. Moderate systolic gradient noted.    Mitral Valve: There is a bileaflet mechanical mitral valve prosthesis. Moderate diastolic gradient noted -possible stenosis    Tricuspid Valve: There is a bioprosthetic valve present. It appears to be functioning normally.    When compared to the previous study dated 4/5/2019, aortic  prosthetic gradient has decreased significantly. The mitral diastolic gradient is mildly increased     Limited echo OCt 2020    Technical Quality: limited visualization    Left ventricle ejection fraction is normal. The estimated left ventricular ejection fraction is 65%.    Normal right ventricular size.    Aortic Valve: Aortic valve not well visualized. There is a mechanical valve present. There is no aortic insufficiency present. Moderate systolic gradient presents-possibly stenotic.    Mitral Valve: There is a bileaflet mechanical mitral valve prosthesis. Moderate diastolic gradient present-possibly stenotic. There is severe calcification of the anterior leaflet subvalvular apparatus. There is severe calcification of the posterior leaflet subvalvular apparatus.    Tricuspid Valve: There is a bioprosthetic valve present. It appears to be functioning normally.    When compared to the previous study dated 9/3/2020, no significant change.     Imaging studies:  CT chest 9/30/2020  IMPRESSION:   1.  Moderate-sized left pleural effusion has not significantly changed from 9/18/2020.     EXAM: XR CHEST 2 VIEWS  LOCATION: [facility]  DATE/TIME: 10/22/2020 7:52 AM     INDICATION: Recurrent pleural effusion.  Thoracocentesis on 10/21, still complaining of shortness of breath.  Please compare  COMPARISON: PA and lateral views of the chest 10/19/2020 and CT of the chest without contrast 09/30/2020     IMPRESSION:      No discernible change in size of left pleural effusion compared to 10/19/2020 which occupies the lower one half the left hemithorax. Associated compressive atelectasis of the majority of the left lower lobe. No pneumothorax.     Organized right posterior and lateral basal pleural fluid and thickening have not increased and remain small in size.     There is a new subsegmental sized opacity within the right lower lobe superior segment compared to 10/19/2020 could represent new focal lung  inflammation or edema.     The cardiac silhouette remains enlarged with conspicuous right and left atrial enlargement. Unchanged slight shift of the heart and mediastinal structures towards the right indicative of chronic right lower lung volume loss. Extensive calcification of   the mitral valve into the basilar left ventricle. There are are mitral and tricuspid valvular prostheses.    XR Chest 2 Views   Narrative   EXAMINATION: XR CHEST 2 VW 1/26/2021 11:21 AM.    COMPARISON: 1/20/2021.    HISTORY: Pleural effusion    FINDINGS: PA and lateral views of the chest. Postsurgical changes of  tricuspid valve replacement with intact median sternotomy wires and  mediastinal surgical clips. Midline trachea. Unchanged cardiomegaly.  Small bilateral pleural effusions, greater on the right similar to  prior. Bibasilar streaky opacities. No new focal airspace  consolidation. No pneumothorax. Left basilar chest tube.    Impression   IMPRESSION:   1. Stable small bilateral pleural effusions with associated  atelectasis. No acute airspace opacity.  2. Stable cardiomegaly.       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 I think her symptoms are due to heart failure. She continues to have leg swelling, orthopnea and elevated BNP. I recommended she follow up with Dr. Velásquez regarding these issues. She is also going to follow up with Dr. Gottlieb for management of the TPC and the pleural effusion. She can follow up with me as needed.     All questions answered. She will call with any questions or concerns.       Phone call duration: 20 minutes

## 2021-06-15 NOTE — TELEPHONE ENCOUNTER
Called to follow-up  with Edith, Joanna's sister, and she is concerned about her health. She has been very weak. Yesterday she states she slept all day and was too weak to come in to her appt. SHe had also forgotten about the appt.  Joanna is always short of breath (per her sister) but she feels she is worse now. She has been losing weight (she is down to 93# on Tuesday) and does not appear to be retaining fluid per her sister' assessment (no swelling). Edith denies fever, cough, or any other symptoms.  Joanna has not taken her diuretic for the last two days due to not wanting to go to the bathroom.  She currently has a Pleurx (chest tube) placed for pleural effusion that is managed by home care.  Edith states Joanna is feeling a little better today.  Home care note from 3/3/21 reviewed, and it does not appear there was an acute concern.  Will call back with recommendation. AB/SHANNON

## 2021-06-15 NOTE — TELEPHONE ENCOUNTER
----- Message -----   From: Matthew Wanda   Sent: 3/4/2021  12:25 PM CST   To: Crsytal Harding RN     General phone call:   PATIENTS , CALLED TO SCHEDULE Follow-up, HOWEVER SHE HAS [employer] MEDICARE ADVANTAGE, AND DUE TO THIS WE ARE UNABLE TO SCHEDULE, [place] DOES NOT ACCEPT THIS INSURANCE.   I AM NOT SURE HOW TO HELP ANY FURTHER BECAUSE OF THIS.   PLEASE CALL   Caller: YAZ RAMOS   Primary cardiologist: SIN GONZALES

## 2021-06-17 NOTE — TELEPHONE ENCOUNTER
----- Message from Patsy Parker V sent at 10/30/2020 11:07 AM CDT -----  Regarding: FW: yearly Velásquez phone Nov    ----- Message -----  From: Daisy Fay RN  Sent: 10/30/2020  11:05 AM CDT  To: Patsy Parker V  Subject: RE: yearly Velásquez phone Nov                      Refer back to [facility] for order please!  ----- Message -----  From: Patsy Parker ESSENCE  Sent: 10/30/2020  10:57 AM CDT  To: Daisy Fay RN  Subject: RE: yearly Velásquez phone Nov                      I look at the recall and it said that they needed a holter monitor and echo prior to this appt.  I see an order in for the holter monitor but not the echo.  She did have an echo in sept per Dr. Beckwith, will that suffice?   ----- Message -----  From: Daisy Fay RN  Sent: 10/27/2020   8:30 AM CDT  To: [facility] Scheduling Registration Pool  Subject: yearly Velásquez phone Nov                          Pt of [place] due to be seen for yearly and refills. Pt has  Chf.              Noted- pt had lengthy hospitalization from 10/19 until yesterday. She was discharged to TCU. Limited echo done during this time and complete in September. Will extend previous Holter monitor order that is in place now. Will send to B to check if this would be required after most recent stay. [place]         [place]. Velásquez  Pt had a recent limited echo during last hospitalization 10/19-10/29. She had echo complete in September. I am inclined to only have her get the Holter monitor as she had those recent echos, however,  I leave to your review. I extended the Holter order through January. Let me know if you think would also need repeat echo complete and I can place!  Thanks much,  Tomás   
Telephone Encounter by Clarisse An RN at 10/30/2020  1:57 PM     Author: Clarisse An RN Service: -- Author Type: Registered Nurse    Filed: 10/30/2020  1:59 PM Encounter Date: 10/30/2020 Status: Signed    : Clarisse An RN (Registered Nurse)       Please call patient to arrange Holter only (no echo) and follow up with Dr. Velásquez shortly thereafter.  Thank you - Clarisse  ------------------------------------------------  Jax Velásquez MD Caswell, Mallory J, RN; Britany Vaughn RN 12 minutes ago (1:42 PM)     Only need the new Holter, she has had enough echoes for now :)     S    Message text       Marilyn Dias RN Benton, Steven L, MD; Britany Vaughn RN 52 minutes ago (1:03 PM)     Mo- in case echo is needed- I extended the holter   Mal              
Gastroenteritis

## 2021-06-19 NOTE — LETTER
Letter by Jax Velásquez MD at      Author: Jax Velásquez MD Service: -- Author Type: --    Filed:  Encounter Date: 8/28/2019 Status: (Other)         Chiquita Flores  [address] [place] [address]      August 28, 2019      Dear Chiquita,    This letter is to remind you that you will be due for your follow up appointment with Dr. Jax Velásquez . To help ensure you are in the best health possible, a regular follow-up with your cardiologist is essential.     Please call our Patient Scheduling Line at [phone] to schedule your appointment at your earliest convenience.  If you have recently scheduled an appointment, please disregard this letter.    We look forward to seeing you again. As always, we are available at the number  above for any questions or concerns you may have.      Sincerely,     The Physicians and Staff of [facility]

## 2021-06-20 NOTE — LETTER
Letter by William Patel MD at      Author: William Patel MD Service: -- Author Type: --    Filed:  Encounter Date: 10/12/2020 Status: (Other)         Micheal Tarango MD  [address]  [place] [address]                                  October 12, 2020    Patient: Chiquita Flores   MR Number: [id number]   YOB: 1945   Date of Visit: 10/12/2020     Dear Dr. Sukhdeep MD:    Thank you for referring Chiquita Flores to me for evaluation. Below are the relevant portions of my assessment and plan of care.    If you have questions, please do not hesitate to call me. I look forward to following Chiquita along with you.    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 The pleural fluid studies and CT scan was reviewed with Joanna and her sister Edith. The fluid appeared to be a borderline exudate in August was modestly elevated LDH. Cytology was negative for malignancy but only 1cc was submitted for cytologic analysis so the evaluation for malignancy was very limited.  The effusion could be due to cardiac disease (although she is on high doses of diuretics already) or she may have a trapped lung with pneumothorax ex vacuo leading to chronic recurrent effusion.     Discussed options for management for recurrent pleural effusions. She appears to be a poor surgical candidate - appears extremely frail, cachectic and with significant cardiac disease I don't think she'd do well with a thoracotomy. Thoracoscopy with pleurodesis might be in option. She is going to meet with our  thoracic surgeon, Dr. Valdez, in a few weeks to discuss this.     In the meantime for immediate relief I recommend another thoracentesis and we will send for cytology again as I think malignancy is still possible.    Recommendations:  - US thoracentesis ordered - will need to hold coumadin and bridge her with LMWH. Will discuss with our nursing staff and radiology  - send for culture/gram stain, cell count, LDH, total protein, pH, glucose and cytology  - check serum LDH and total protein  - meet with Dr. Valdez on 10/22 to discuss surgical options.  - repeat CXR prior to next visit.     Follow up in 1 month with CXR.    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 Velásquez.  She reveloped right pleural effusion in 2015 after her TV surgery. This was drained and fluid analysis was unremarkable, no malignancy. She was hospitalized this past Aug 2020 at [facility] with recurrent dyspnea. She underwent left thoracentesis, 800cc serous fluid removed, 79% lymphocytes, cytology negative, borderline exudate by LDH criteria. Seen by [facility] and fluid was thought to be benign.  She comes into clinic reporting severe dyspnea on exertion. She is very limited by her breathing. She can barely climb 1 flight of stairs. She appears very frail and thin.  Denies chest pain. Has chronic lower extremity edema L > R. Takes high doses of torsemide and does not want to increase diuretics. Also takes potassium which she despises doing.   No pleuritic chest pain, cough, chest pressure, fevers or chills.     ROS:  A 12-system review was  "obtained and was negative with the exception of the symptoms endorsed in the history of present illness.    PMH:  Past Medical History:   Diagnosis Date   ? Adult hypothyroidism 5/8/2009   ? Ascites 2015    due to hepatic congestion from severe TR   ? Bilateral pleural effusion 10/19/2015    left was drained at [facility] per Dr. Celeste Gar; right not drained as right lung was \"trapped\" per pulmonary consultant there.   ? Cataract 11/3/2011   ? Cerebral infarct (H) 5/13/2015   ? Closed femur fracture (H) 2015   ? Dyslipidemia, goal LDL below 100 2017   ? History of CVA (cerebrovascular accident) 12/5/2019   ? History of right heart failure 2015    due to severe TR as a result of rheumatic valve disease   ? History of transfusion    ? Nonocclusive coronary atherosclerosis of native coronary artery 2015    minimal disease per angio report of Dr. Elias   ? Permanent Atrial Fibrillation, valvular, as a result of rheumatic heart disease 1985   ? Rheumatic valvular disease 1985   ? Secondary Pulmonary Hypertension     mean pulmonary artery pressure was 28 mmHg at right heart catheterization performed in 2015 by Dr. Vidal Elias; she has a history of rheumatic valvular heart disease with aortic and mitral valve mechanical prostheses implanted in 1985 at [facility].    ? Tricuspid Regurgitation, now SP tricuspid valve replacement 2011   ? Type 2 Diabetes Mellitus 2015    controlled with diet       PSH:  Past Surgical History:   Procedure Laterality Date   ? MECHANICAL AORTIC AND MITRAL VALVE REPLACEMENT  02/08/1985    #21 [address]. Gurwinder aortic and #25 [address]. Gurwinder mitral done by a [facility] surgeon at [facility]   ? CO PARTIAL HIP REPLACEMENT  2013    ball prosthesis to treat hip fracture   ? TRICUSPID VALVE REPLACEMENT  9/1/2015    29 mm St. Gurwinder Epic tissue valve by Dr. Neo Teague at [facility]       Allergies:  No Known Allergies    Family HX:  Family History   Problem Relation Age of Onset   ? Sudden death Father 55        no " "autopsy   ? Alzheimer's disease Sister    ? Other Sister          from burn injury while playing with matches   ? Other Mother         \"old age\", fell two months before her death and  in a nursing home   ? Osteoporosis Sister         living in [facility]   ? Arthritis Sister         lives in an apartment   ? No Medical Problems Sister    ? No Medical Problems Sister         she lives with Chiquita       Social Hx:  Social History     Socioeconomic History   ? Marital status: Single     Spouse name: Not on file   ? Number of children: 0   ? Years of education: Not on file   ? Highest education level: Not on file   Occupational History   ? Occupation: Tracky keeping     Employer: RETIRED     Comment: retired at 64   Social Needs   ? Financial resource strain: Not on file   ? Food insecurity     Worry: Not on file     Inability: Not on file   ? Transportation needs     Medical: Not on file     Non-medical: Not on file   Tobacco Use   ? Smoking status: Never Smoker   ? Smokeless tobacco: Never Used   Substance and Sexual Activity   ? Alcohol use: No   ? Drug use: No   ? Sexual activity: Never   Lifestyle   ? Physical activity     Days per week: Not on file     Minutes per session: Not on file   ? Stress: Not on file   Relationships   ? Social connections     Talks on phone: Not on file     Gets together: Not on file     Attends Confucianism service: Not on file     Active member of club or organization: Not on file     Attends meetings of clubs or organizations: Not on file     Relationship status: Not on file   ? Intimate partner violence     Fear of current or ex partner: Not on file     Emotionally abused: Not on file     Physically abused: Not on file     Forced sexual activity: Not on file   Other Topics Concern   ? Not on file   Social History Narrative    Lives with her youngest sister, Edith, in a home they jointly own.       Current Meds:  Current Outpatient Medications   Medication Sig " "Dispense Refill   ? alendronate (FOSAMAX) 70 MG tablet Take 1 tablet by mouth once a week.     ? amoxicillin (AMOXIL) 500 MG capsule Prior to the Dentist     ? aspirin 81 mg chewable tablet Chew 81 mg daily.     ? atorvastatin (LIPITOR) 10 MG tablet Take 1 tablet (10 mg total) by mouth daily. 90 tablet 3   ? levothyroxine (SYNTHROID, LEVOTHROID) 112 MCG tablet Take 1 tablet by mouth daily.     ? metoprolol succinate (TOPROL-XL) 25 MG TAKE 2 TABLETS EVERY  tablet 1   ? torsemide (DEMADEX) 20 MG tablet Take 2 tablets (40 mg total) by mouth 2 (two) times a day at 9am and 6pm. torsemide 40 mg (2 tablets) in the morning and 20 mg (1 tablet) in the afternoon. 180 tablet 1   ? warfarin (COUMADIN) 5 MG tablet Take 1 tabet by mouth 3 days per week and 1.5 tablets 4 days per week. Adjust dose based on INR results as directed.       No current facility-administered medications for this visit.        Physical Exam:  Ht 5' 3.5\" (1.613 m)   Wt 109 lb (49.4 kg)   BMI 19.01 kg/m    Gen: awake, alert, oriented, no distress  HEENT: nasal turbinates are unremarkable, no oropharyngeal lesions, no cervical or supraclavicular lymphadenopathy  CV: RRR, no M/G/R  Resp: right lung clear. Diminished at left base with +dullness to percussion.   Abd: soft, nontender, no palpable organomegaly  Skin: no apparent rashes  Ext: no cyanosis, clubbing or edema  Neuro: alert, nonfocal    Labs:  Reviewed    Chem panel OK  hgb 10.0    Pleural fluid from [facility] Aug 2020  79% lymphs, 849 total wbc  Protein 2.8    Glucose 148  Cytology neg for malignancy, but limited by low cell count (only 1cc submitted)    Pleural fluid 2015  53% lymphs, 25% mono  , protein 2.7  Neg for malignancy    Echo Sept 2020    Left ventricle ejection fraction is normal. The calculated left ventricular ejection fraction is 57%.    Right ventricle not well visualized. The systolic function is moderately reduced.    Aortic Valve: There is a bileaflet " tilting disc mechanical valve present. There is no aortic insufficiency present. Moderate systolic gradient noted.    Mitral Valve: There is a bileaflet mechanical mitral valve prosthesis. Moderate diastolic gradient noted -possible stenosis    Tricuspid Valve: There is a bioprosthetic valve present. It appears to be functioning normally.    When compared to the previous study dated 4/5/2019, aortic prosthetic gradient has decreased significantly. The mitral diastolic gradient is mildly increased         Imaging studies:  EXAM: CT CHEST WO CONTRAST  LOCATION: [facility]  DATE/TIME: 9/30/2020 12:40 PM     INDICATION: Recurrent left pleural effusion.  COMPARISON: Cardiac CT 9/18/2020.  TECHNIQUE: CT chest without IV contrast. Multiplanar reformats were obtained. Dose reduction techniques were used.  CONTRAST: None.     FINDINGS:   LUNGS AND PLEURA: Moderate-sized, freely layering left pleural effusion has not significantly changed in size when compared to previous. Underlying consolidation/atelectasis in the left lower lobe and inferior lingula. Mild subsegmental atelectasis or   scarring at the right lung base, with chronic right-sided pleural thickening and trace right pleural effusion.     MEDIASTINUM/AXILLAE: Prior CABG, and prosthetic aortic, tricuspid, and mitral valves. Marked cardiomegaly particularly the right atrium. No lymphadenopathy. No pericardial effusion.     UPPER ABDOMEN: The IVC and hepatic veins are distended.     MUSCULOSKELETAL: Unremarkable.     IMPRESSION:   1.  Moderate-sized left pleural effusion has not significantly changed from 9/18/2020.    PFT's  n/a

## 2021-06-21 NOTE — LETTER
Letter by William Patel MD at      Author: William Patel MD Service: -- Author Type: --    Filed:  Encounter Date: 2/16/2021 Status: (Other)         Micheal Tarango MD  [address]  [place] [address]                                  February 16, 2021    Patient: Chiquita Flores   MR Number: [id number]   YOB: 1945   Date of Visit: 2/16/2021     Dear Dr. Sukhdeep MD:    Thank you for referring Chiquita Flores to me for evaluation. Below are the relevant portions of my assessment and plan of care.    If you have questions, please do not hesitate to call me. I look forward to following Chiquita along with you.    Sincerely,        William Patel MD          CC  MD Castillo Zurita MD Roy Joseph Cho, MD Sofer, Avraham, MD  2/16/2021 11:45 AM  Sign when Signing Visit  Chiquita Flores is a 76 y.o. female who is being evaluated via a billable telephone visit.      What phone number would you like to be contacted at? [phone]  How would you like to obtain your AVS? AVS Preference: MyChart.    CC: pleural effusion follow up    Interim history: I last saw carson on 11/20/20. Since that time, she had a PleuRx placed at the . She is following with Dr. Marino Gottlieb in that regard.  Today, she reports she's doing well.  She is draining about 450-550 mL per day. There are occasional air bubbles toward the end. The fluid is reddish-brown colored.   No pain when it drains. her sister (present on the call) thinks the PleuRx has been helpful for her breathing. She appears to have less shortness of breath.    Studies  Labs:  Reviewed     Pleural fluid 10/20/2020  Wbc 82, 71% lymphs    Protein 1.7  PH 8.0  Glucose 141  Cytology neg for cancer  Micro neg     Previous labs   -> 631 on 10/29/2020  Chem panel, Na 128 on 12/26/20.  hgb 10.3 on 10/30/20.   Albumin 3.3 on 10/20/20     Pleural fluid from [facility] Aug 2020  79% lymphs, 849 total wbc  Protein 2.8  LD  221  Glucose 148  Cytology neg for malignancy, but limited by low cell count (only 1cc submitted)     Pleural fluid 2015  53% lymphs, 25% mono  , protein 2.7  Neg for malignancy     Echo Sept 2020    Left ventricle ejection fraction is normal. The calculated left ventricular ejection fraction is 57%.    Right ventricle not well visualized. The systolic function is moderately reduced.    Aortic Valve: There is a bileaflet tilting disc mechanical valve present. There is no aortic insufficiency present. Moderate systolic gradient noted.    Mitral Valve: There is a bileaflet mechanical mitral valve prosthesis. Moderate diastolic gradient noted -possible stenosis    Tricuspid Valve: There is a bioprosthetic valve present. It appears to be functioning normally.    When compared to the previous study dated 4/5/2019, aortic prosthetic gradient has decreased significantly. The mitral diastolic gradient is mildly increased     Limited echo OCt 2020    Technical Quality: limited visualization    Left ventricle ejection fraction is normal. The estimated left ventricular ejection fraction is 65%.    Normal right ventricular size.    Aortic Valve: Aortic valve not well visualized. There is a mechanical valve present. There is no aortic insufficiency present. Moderate systolic gradient presents-possibly stenotic.    Mitral Valve: There is a bileaflet mechanical mitral valve prosthesis. Moderate diastolic gradient present-possibly stenotic. There is severe calcification of the anterior leaflet subvalvular apparatus. There is severe calcification of the posterior leaflet subvalvular apparatus.    Tricuspid Valve: There is a bioprosthetic valve present. It appears to be functioning normally.    When compared to the previous study dated 9/3/2020, no significant change.     Imaging studies:  CT chest 9/30/2020  IMPRESSION:   1.  Moderate-sized left pleural effusion has not significantly changed from 9/18/2020.     EXAM: XR CHEST  2 VIEWS  LOCATION: [facility]  DATE/TIME: 10/22/2020 7:52 AM     INDICATION: Recurrent pleural effusion.  Thoracocentesis on 10/21, still complaining of shortness of breath.  Please compare  COMPARISON: PA and lateral views of the chest 10/19/2020 and CT of the chest without contrast 09/30/2020     IMPRESSION:      No discernible change in size of left pleural effusion compared to 10/19/2020 which occupies the lower one half the left hemithorax. Associated compressive atelectasis of the majority of the left lower lobe. No pneumothorax.     Organized right posterior and lateral basal pleural fluid and thickening have not increased and remain small in size.     There is a new subsegmental sized opacity within the right lower lobe superior segment compared to 10/19/2020 could represent new focal lung inflammation or edema.     The cardiac silhouette remains enlarged with conspicuous right and left atrial enlargement. Unchanged slight shift of the heart and mediastinal structures towards the right indicative of chronic right lower lung volume loss. Extensive calcification of   the mitral valve into the basilar left ventricle. There are are mitral and tricuspid valvular prostheses.    XR Chest 2 Views   Narrative   EXAMINATION: XR CHEST 2 VW 1/26/2021 11:21 AM.    COMPARISON: 1/20/2021.    HISTORY: Pleural effusion    FINDINGS: PA and lateral views of the chest. Postsurgical changes of  tricuspid valve replacement with intact median sternotomy wires and  mediastinal surgical clips. Midline trachea. Unchanged cardiomegaly.  Small bilateral pleural effusions, greater on the right similar to  prior. Bibasilar streaky opacities. No new focal airspace  consolidation. No pneumothorax. Left basilar chest tube.    Impression   IMPRESSION:   1. Stable small bilateral pleural effusions with associated  atelectasis. No acute airspace opacity.  2. Stable cardiomegaly.       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 I think her symptoms are due to heart failure. She continues to have leg swelling, orthopnea and elevated BNP. I recommended she follow up with Dr. Velásquez regarding these issues. She is also going to follow up with Dr. Gottlieb for management of the TPC and the pleural effusion. She can follow up with me as needed.     All questions answered. She will call with any questions or concerns.       Phone call duration: 20 minutes

## 2021-06-21 NOTE — LETTER
Letter by Castillo Valdez MD at      Author: Castillo Valdez MD Service: -- Author Type: --    Filed:  Encounter Date: 11/19/2020 Status: (Other)         Micheal Niurka Tarango MD  [address]  [place] [address]                                  November 23, 2020    Patient: Chiquita Flores   MR Number: [id number]   YOB: 1945   Date of Visit: 11/19/2020     Dear Dr. Sukhdeep MD:    Thank you for referring Chiquita Flores to me for evaluation. Below are the relevant portions of my assessment and plan of care.    If you have questions, please do not hesitate to call me. I look forward to following Chiquita along with you.    Sincerely,        Castillo Valdez MD          CC  MD Bryan Palacios MD Andrade, Rafael S, MD  11/23/2020  9:35 AM  Sign when Signing Visit  THORACIC SURGERY OFFICE NEW PATIENT VISIT      Dear Dr. Tarango,    I saw Ms. Flores at Dr. Stanford request in consultation for the evaluation and treatment of a recurrent LEFT pleural effusion, possibly of cardiac origin.     HPI  Ms. Chiquita Flores is a 75 y.o. year-old female with a long-standing left pleural effusion that has been tapped before and recurs despite maximum medical therapy. Pleural fluid analysis was negative for malignancy. She is moderately symptomatic, and she is interested to find a longer-lasting solution.    Tests   Echo (10/20/2020): moderate stenosis of aortic and mitral prosthetic valves with moderate biatrial enlargement, normally functioning tricuspid prosthetic valve.    CT chest (9/30/2020): moderate-sized free layering LEFT effusion    Pleural fluid (10/20/2020): transudate, cytology negative     PMH    Past Medical History:   Diagnosis Date   ? Adult hypothyroidism 05/08/2009   ? Ascites 2015    due to hepatic congestion from severe TR   ? Bilateral pleural effusion 10/19/2015    left was drained at [facility] per Dr. Celeste Gar; right not drained as right lung was  "\"trapped\" per pulmonary consultant there.   ? Cataract 11/03/2011   ? Cerebral infarct (H) 05/13/2015   ? Closed femur fracture (H) 2015   ? BLANCHARD (dyspnea on exertion)    ? Dyslipidemia, goal LDL below 100 2017   ? History of CVA (cerebrovascular accident) 12/05/2019   ? History of right heart failure 2015    due to severe TR as a result of rheumatic valve disease   ? History of transfusion    ? Nonocclusive coronary atherosclerosis of native coronary artery 2015    minimal disease per angio report of Dr. Elias   ? Permanent Atrial Fibrillation, valvular, as a result of rheumatic heart disease 1985   ? Recurrent left pleural effusion    ? Rheumatic valvular disease 1985   ? Secondary Pulmonary Hypertension     mean pulmonary artery pressure was 28 mmHg at right heart catheterization performed in 2015 by Dr. Vidal Elias; she has a history of rheumatic valvular heart disease with aortic and mitral valve mechanical prostheses implanted in 1985 at [facility].    ? Tricuspid Regurgitation, now SP tricuspid valve replacement 2011   ? Type 2 Diabetes Mellitus 2015    controlled with diet      Past Surgical History:   Procedure Laterality Date   ? MECHANICAL AORTIC AND MITRAL VALVE REPLACEMENT  02/08/1985    #21 [employer]. Gurwinder aortic and #25 St. Gurwinder mitral done by a [facility] surgeon at [facility]   ? [place] PARTIAL HIP REPLACEMENT  2013    ball prosthesis to treat hip fracture   ? TRICUSPID VALVE REPLACEMENT  9/1/2015    29 mm St. Gurwinder Epic tissue valve by Dr. Neo Teague at [facility]   ? US THORACENTESIS  10/20/2020         ETOH negative  TOB negative    Physical examination  /70   Pulse 77   Ht 5' 3\" (1.6 m)   Wt 102 lb (46.3 kg)   SpO2 99%   Breastfeeding No   BMI 18.07 kg/m      Ms. Flores appears pale and frail.     From a personal perspective, she lives with her sister, Edith.      IMPRESSION   1. Pleural effusion        75 y.o. year-old female with a recurrent LEFT pleural effusion, possibly of cardiac " origin.    PLAN  I spent a total of 30 minutes with Ms. Flores, more than 50% of which were spent in counseling, coordination of care, and face-to-face time. I reviewed the plan as follows:    Ms. Flores has a recurrent pleural effusion refractory to medical management, and in my opinion, she would be best served with an indwelling pleural catheter.    Indwelling pleural catheter: I reviewed the indications, risks, and benefits of an indwelling pleural catheter with Ms. Flores. She had all her questions answered and wishes to proceed. I also discussed her case with Dr. Avendaño, who can place and manage an indwelling pleural catheter.    They had all their questions answered and were in agreement with the plan.  I appreciate the opportunity to participate in the care of your patient and will keep you updated.    Sincerely,

## 2021-06-21 NOTE — LETTER
Letter by William Patel MD at      Author: William Patel MD Service: -- Author Type: --    Filed:  Encounter Date: 11/20/2020 Status: (Other)         Micheal Tarango MD  [address]  [place] [address]                                  November 20, 2020    Patient: Chiquita Flores   MR Number: [id number]   YOB: 1945   Date of Visit: 11/20/2020     Dear Dr. Sukhdeep MD:    Thank you for referring Chiquita Flores to me for evaluation. Below are the relevant portions of my assessment and plan of care.    If you have questions, please do not hesitate to call me. I look forward to following Chiquita along with you.    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 She was hospitalized last month and had a thoracentesis. The etiology for the recurrent exudative effusion is unclear but may be related to prior cardiothoracic surgical procedure vs. Chronic valvular disease with ongoing congestive heart failure (although unilateral effusion would be unusual for this). Cytology has been negative for cancer the last two occasions. The lung may be trapped at this point and the fluid will continue to reaccumulate. I think she is a poor surgical candidate given her extensive cardiac issues, poor nutritional status and frailty. She met with our thoracic surgeon yesterday and it appears that placement of a tunneled pleural cathter was discussed. This would be a reasonable approach and may lead to auto-pleurodesis in up to 2/3 of cases. It sounds like Joanna is interested in this  "option.     Recommendations:  - will discuss tunneled pleural catheter with Dr. Valdez  - follow up with me in 2-3 months to check in.    All questions answered.      William Patel MD (Avi)  [facility] Pulmonary & Critical Care  Pager [phone]  Clinic [phone]    CCx: followup recurrent pleural effusion    HPI: Interim history: I last saw Chiquita on 10/12/2020. Since that time she was admitted to [facility] for CHF exacerbation and shortness of breath. She underwent thoracentesis while inpatient. Results were negative for cancer, lymphocytic exudate as previously. She felt improved after the thoracentesis. 900cc of brownish colored fluid was removed.  Today, she reports she's having recurrent shortness of breath again but not as bad as when I last saw her. Denies chest pain or chest pressure.  She met with Dr. Valdez yesterday - I am unable to see his progress note. PleuRx catheter was discussed and it appears she is in favor of moving forward with this.     ROS:  A 12-system review was obtained and was negative with the exception of the symptoms endorsed in the history of present illness.    PMH:  Past Medical History:   Diagnosis Date   ? Adult hypothyroidism 05/08/2009   ? Ascites 2015    due to hepatic congestion from severe TR   ? Bilateral pleural effusion 10/19/2015    left was drained at [facility] per Dr. Celeste Gar; right not drained as right lung was \"trapped\" per pulmonary consultant there.   ? Cataract 11/03/2011   ? Cerebral infarct (H) 05/13/2015   ? Closed femur fracture (H) 2015   ? BLANCHARD (dyspnea on exertion)    ? Dyslipidemia, goal LDL below 100 2017   ? History of CVA (cerebrovascular accident) 12/05/2019   ? History of right heart failure 2015    due to severe TR as a result of rheumatic valve disease   ? History of transfusion    ? Nonocclusive coronary atherosclerosis of native coronary artery 2015    minimal disease per angio report of Dr. Elias   ? Permanent Atrial " "Fibrillation, valvular, as a result of rheumatic heart disease    ? Recurrent left pleural effusion    ? Rheumatic valvular disease    ? Secondary Pulmonary Hypertension     mean pulmonary artery pressure was 28 mmHg at right heart catheterization performed in  by Dr. Vidal Elias; she has a history of rheumatic valvular heart disease with aortic and mitral valve mechanical prostheses implanted in  at \[facility]".    ? Tricuspid Regurgitation, now SP tricuspid valve replacement    ? Type 2 Diabetes Mellitus     controlled with diet       PSH:  Past Surgical History:   Procedure Laterality Date   ? MECHANICAL AORTIC AND MITRAL VALVE REPLACEMENT  1985    #[address] aortic and #25 [address] mitral done by a [facility] surgeon at \[facility]\""   ? MS PARTIAL HIP REPLACEMENT      ball prosthesis to treat hip fracture   ? TRICUSPID VALVE REPLACEMENT  2015    29 mm [address]inder Epic tissue valve by Dr. Neo Teague at [facility]   ? US THORACENTESIS  10/20/2020       Allergies:  No Known Allergies    Family HX:  Family History   Problem Relation Age of Onset   ? Sudden death Father 55        no autopsy   ? Alzheimer's disease Sister    ? Other Sister          from burn injury while playing with matches   ? Other Mother         \"old age\", fell two months before her death and  in a nursing home   ? Osteoporosis Sister         living in [facility]   ? Arthritis Sister         lives in an apartment   ? No Medical Problems Sister    ? No Medical Problems Sister         she lives with Chiquita       Social Hx:  Social History     Socioeconomic History   ? Marital status: Single     Spouse name: Not on file   ? Number of children: 0   ? Years of education: Not on file   ? Highest education level: Not on file   Occupational History   ? Occupation: book keeping     Employer: RETIRED     Comment: retired at 64   Social Needs   ? Financial resource strain: Not on file "   ? Food insecurity     Worry: Not on file     Inability: Not on file   ? Transportation needs     Medical: Not on file     Non-medical: Not on file   Tobacco Use   ? Smoking status: Never Smoker   ? Smokeless tobacco: Never Used   Substance and Sexual Activity   ? Alcohol use: No   ? Drug use: No   ? Sexual activity: Never   Lifestyle   ? Physical activity     Days per week: Not on file     Minutes per session: Not on file   ? Stress: Not on file   Relationships   ? Social connections     Talks on phone: Not on file     Gets together: Not on file     Attends Shinto service: Not on file     Active member of club or organization: Not on file     Attends meetings of clubs or organizations: Not on file     Relationship status: Not on file   ? Intimate partner violence     Fear of current or ex partner: Not on file     Emotionally abused: Not on file     Physically abused: Not on file     Forced sexual activity: Not on file   Other Topics Concern   ? Not on file   Social History Narrative    Lives with her youngest sister, Edith, in a home they jointly own.       Current Meds:  Current Outpatient Medications   Medication Sig Dispense Refill   ? alendronate (FOSAMAX) 70 MG tablet Take 1 tablet by mouth once a week.     ? amoxicillin (AMOXIL) 500 MG capsule Prior to the Dentist     ? aspirin 81 mg chewable tablet Chew 81 mg daily.     ? atorvastatin (LIPITOR) 10 MG tablet Take 1 tablet (10 mg total) by mouth daily. 90 tablet 3   ? cephalexin (KEFLEX) 500 MG capsule Take 500 mg by mouth.     ? levothyroxine (SYNTHROID, LEVOTHROID) 112 MCG tablet Take 1 tablet by mouth daily.     ? metoprolol succinate (TOPROL-XL) 25 MG Take 3 tablets (75 mg total) by mouth daily. (Patient taking differently: Take 50 mg by mouth daily. ) 30 tablet 0   ? mirtazapine (REMERON) 7.5 MG tablet Take 1 tablet (7.5 mg total) by mouth at bedtime. 30 tablet 0   ? polyethylene glycol (MIRALAX) 17 gram packet Take 1 packet (17 g total) by mouth  "daily. Hold for loose stools 30 packet 0   ? spironolactone (ALDACTONE) 25 MG tablet Take 1 tablet (25 mg total) by mouth 2 (two) times a day at 9am and 6pm. 30 tablet 0   ? torsemide (DEMADEX) 20 MG tablet Take 3 tablets (60 mg total) by mouth 2 (two) times a day at 9am and 6pm. 60 tablet 0   ? warfarin (COUMADIN) 5 MG tablet Take 7.5 mg by mouth Monday, Wednesday, Friday and 5 mg all other days. Adjust dose based on INR results as directed.       No current facility-administered medications for this visit.        Physical Exam:  /62   Pulse 96   Resp 18   Ht 5' 3\" (1.6 m)   Wt 102 lb (46.3 kg)   SpO2 99%   BMI 18.07 kg/m    Gen: awake, alert, oriented, no distress  HEENT: nasal turbinates are unremarkable, no oropharyngeal lesions, no cervical or supraclavicular lymphadenopathy  CV: RRR, no M/G/R  Resp: slightly diminished on the left with dullness to percussion.   Abd: soft, nontender, no palpable organomegaly  Skin: no apparent rashes  Ext: no cyanosis, clubbing or edema  Neuro: alert, nonfocal    Labs:  Reviewed    Pleural fluid 10/20/2020  Wbc 82, 71% lymphs    Protein 1.7  PH 8.0  Glucose 141  Cytology neg for cancer  Micro neg    Previous labs    Chem panel OK  hgb 10.0     Pleural fluid from [facility] Aug 2020  79% lymphs, 849 total wbc  Protein 2.8    Glucose 148  Cytology neg for malignancy, but limited by low cell count (only 1cc submitted)     Pleural fluid 2015  53% lymphs, 25% mono  , protein 2.7  Neg for malignancy     Echo Sept 2020    Left ventricle ejection fraction is normal. The calculated left ventricular ejection fraction is 57%.    Right ventricle not well visualized. The systolic function is moderately reduced.    Aortic Valve: There is a bileaflet tilting disc mechanical valve present. There is no aortic insufficiency present. Moderate systolic gradient noted.    Mitral Valve: There is a bileaflet mechanical mitral valve prosthesis. Moderate diastolic " gradient noted -possible stenosis    Tricuspid Valve: There is a bioprosthetic valve present. It appears to be functioning normally.    When compared to the previous study dated 4/5/2019, aortic prosthetic gradient has decreased significantly. The mitral diastolic gradient is mildly increased    Limited echo OCt 2020    Technical Quality: limited visualization    Left ventricle ejection fraction is normal. The estimated left ventricular ejection fraction is 65%.    Normal right ventricular size.    Aortic Valve: Aortic valve not well visualized. There is a mechanical valve present. There is no aortic insufficiency present. Moderate systolic gradient presents-possibly stenotic.    Mitral Valve: There is a bileaflet mechanical mitral valve prosthesis. Moderate diastolic gradient present-possibly stenotic. There is severe calcification of the anterior leaflet subvalvular apparatus. There is severe calcification of the posterior leaflet subvalvular apparatus.    Tricuspid Valve: There is a bioprosthetic valve present. It appears to be functioning normally.    When compared to the previous study dated 9/3/2020, no significant change.    Imaging studies:  CT chest 9/30/2020  IMPRESSION:   1.  Moderate-sized left pleural effusion has not significantly changed from 9/18/2020.    EXAM: XR CHEST 2 VIEWS  LOCATION: [facility]  DATE/TIME: 10/22/2020 7:52 AM     INDICATION: Recurrent pleural effusion.  Thoracocentesis on 10/21, still complaining of shortness of breath.  Please compare  COMPARISON: PA and lateral views of the chest 10/19/2020 and CT of the chest without contrast 09/30/2020     IMPRESSION:      No discernible change in size of left pleural effusion compared to 10/19/2020 which occupies the lower one half the left hemithorax. Associated compressive atelectasis of the majority of the left lower lobe. No pneumothorax.     Organized right posterior and lateral basal pleural fluid and  thickening have not increased and remain small in size.     There is a new subsegmental sized opacity within the right lower lobe superior segment compared to 10/19/2020 could represent new focal lung inflammation or edema.     The cardiac silhouette remains enlarged with conspicuous right and left atrial enlargement. Unchanged slight shift of the heart and mediastinal structures towards the right indicative of chronic right lower lung volume loss. Extensive calcification of   the mitral valve into the basilar left ventricle. There are are mitral and tricuspid valvular prostheses.    Pulmonary Function Testing  n/a

## 2021-06-25 NOTE — PROGRESS NOTES
"Progress Notes by Jax Velásquez MD at 7/28/2017  2:50 PM     Author: Jax Velásquez MD Service: -- Author Type: Physician    Filed: 7/28/2017  3:17 PM Encounter Date: 7/28/2017 Status: Signed    : Jax Velásquez MD (Physician)           Click to link to [facility] Note      Assessment:    1. Permanent atrial fibrillation     2. Secondary Pulmonary Hypertension         Plan:    1.  Recheck hemoglobin today; the last hemoglobin on file from [facility] in 2015 was 9.5.  Her increased trans-prosthetic aortic valve gradient could be related to anemia.  The normal BNP level and her stable clinical status are reassuring.  If she is still anemic, I have asked her to review this with Dr. Marcial.  2.  Return to see Dr. Velásquez in 1 year after new echocardiogram and BNP level.  I would foresee repeating the Holter monitor in 2 years.    An After Visit Summary was printed and given to the patient.    Subjective:    Chiquita Flores returned for a planned annual follow up visit.    She feels well and has no cardiovascular concerns at this time.  We reviewed her medication list and I learned that she is stopped furosemide and potassium.  She does not experience shortness of breath, orthopnea, or lower extremity edema.  She is aware of her chronically irregular pulse but does not become lightheaded or lose consciousness.  Her appetite is been good.    Past Medical History:    Patient Active Problem List   Diagnosis   ? Secondary Pulmonary Hypertension   ? Permanent Atrial Fibrillation       Past Medical History:   Diagnosis Date   ? Adult hypothyroidism 5/8/2009   ? Ascites 2015    due to hepatic congestion from severe TR   ? Bilateral pleural effusion 10/19/2015    Left was drained at [facility] per Dr. Celeste Gar; right not drained as right lung was \"trapped\" per pulmonary consultant there.   ? Cataract 11/3/2011   ? Cerebral infarct 5/13/2015   ? Closed femur fracture 2015 "   ? Dyslipidemia, goal LDL below 100    ? Essential hypertension    ? History of transfusion    ? Nonocclusive coronary atherosclerosis of native coronary artery 2015    minimal disease per angio report of Dr. Elias   ? Rheumatic valvular disease 1985   ? Right heart failure 2015    due to severe TR as a result of rheumatic valve disease   ? Secondary Pulmonary Hypertension     Mean pulmonary artery pressure was 28 mmHg at right heart catheterization performed in 2015 by Dr. Vidal Elias; she has a history of rheumatic valvular heart disease with aortic and mitral valve mechanical prostheses implanted in 1985 at [facility].    ? Tricuspid Regurgitation, now SP tricuspid valve replacement 2011   ? Type 2 Diabetes Mellitus        Past Surgical History:   Procedure Laterality Date   ? OR PARTIAL HIP REPLACEMENT  2013    Partial Hip Replacement With Prosthesis;  Proc Date: 01/01/2013;  Comments: ball prosthesis to treat hip fx   ? OR REPLACEMENT OF MITRAL VALVE  1985    #[address]   ? OR RPLCMT PROST AORTIC VALVE OPEN XCP HOMOGRF/STENT  1985    #[address]   ? TRICUSPID VALVE REPLACEMENT  9/1/2015    29 mm [address] Epic tissue valve by Dr. Neo Teague at [facility]        reports that she has never smoked. She has never used smokeless tobacco. She reports that she does not drink alcohol or use illicit drugs.    Family History   Problem Relation Age of Onset   ? Acute Myocardial Infarction Father    ? Alzheimer's disease Sister        Outpatient Encounter Prescriptions as of 7/28/2017   Medication Sig Dispense Refill   ? alendronate (FOSAMAX) 70 MG tablet Take 1 tablet by mouth once a week.     ? amoxicillin (AMOXIL) 500 MG capsule Prior to the Dentist     ? atorvastatin (LIPITOR) 10 MG tablet Take 10 mg by mouth bedtime.     ? calcium carbonate-vitamin D2 500 mg(1,250mg) -200 unit tablet Take 1 tablet by mouth 2 (two) times a day.     ? levothyroxine (SYNTHROID, LEVOTHROID) 112 MCG tablet Take 1 tablet by mouth  "daily.     ? metoprolol succinate (TOPROL-XL) 25 MG TAKE 2 TABLETS EVERY  tablet 1   ? MULTIVITAMIN ORAL Take 1 tablet by mouth daily.     ? warfarin (COUMADIN) 5 MG tablet Take 1 tabet by mouth 3 days per week and 1.5 tablets 4 days per week. Adjust dose based on INR results as directed.       No facility-administered encounter medications on file as of 7/28/2017.        Review of Systems:     General: WNL  Eyes: WNL  Ears/Nose/Throat: WNL  Lungs: WNL  Heart: WNL  Stomach: WNL  Bladder: WNL  Muscle/Joints: WNL  Skin: WNL  Nervous System: WNL  Mental Health: WNL     Blood: WNL    Objective:     /62 (Patient Site: Left Arm, Patient Position: Sitting, Cuff Size: Adult Regular)  Pulse 88  Resp 20  Ht 5' 4\" (1.626 m)  Wt 111 lb 12.8 oz (50.7 kg)  BMI 19.19 kg/m2  Wt Readings from Last 5 Encounters:   07/28/17 111 lb 12.8 oz (50.7 kg)   05/16/16 111 lb (50.3 kg)   11/03/15 106 lb (48.1 kg)   09/23/15 109 lb (49.4 kg)   08/08/15 123 lb 1.6 oz (55.8 kg)        Physical Exam:    GENERAL APPEARANCE: alert, no apparent distress  EYES: no scleral icterus  NECK: no carotid bruits or adenopathy, jugular venous pressure is less than 5 cm  CHEST: symmetric, the lungs are clear to auscultation  CARDIOVASCULAR: irregular rhythm with clear prosthetic tones and no murmur or gallop  EXTREMITIES: no cyanosis, clubbing or edema  SKIN: no xanthelasma, but there are venous stasis changes of both legs  NEUROLOGIC: normal gait and coordination  PSYCHIATRIC: mood and affect are normal    Cardiac Testing:    [facility] Holter Report     Interpretation:     The predominant rhythm is atrial fibrillation which is present throughout the monitoring period.  Ventricular response ranges from 41 bpm to 146 bpm averaging 76 bpm over the 24 hour monitoring.  Average resting ventricular response was  65 bpm.  Average ventricular response with activity was 70 to 120 bpm.  An isolated R to R interval of 2.7 seconds was seen at " 4 AM.  There were no significant pauses or bradycardia.     Rare ventricular beats were present, 6 over 24 hours with one ventricular couplet.  No runs of ventricular tachycardia were noted.     Symptoms:  Symptoms of Slight shortness of breath were associated with Atrial fibrillation with a ventricular response of 90 bpm.     Conclusion: Persistent atrial fibrillation with controlled ventricular response.    Echocardiogram:      When compared to the previous study dated 5/9/2016, the gradient across the mechanical prosthetic aortic valve has increased    Left ventricle ejection fraction is normal. The estimated left ventricular ejection fraction is 60%.    Moderate biatrial enlargement    Normal bioprosthetic tricuspic valve    Normal mechanical prosthetic mitral valve    Mechanical prosthetic aortic valve with moderate stenosis suggested     Imaging:    No results found.    Lab Results:    Lab Results   Component Value Date    CREATININE 0.82 08/07/2015    BUN 16 08/07/2015     08/07/2015    K 4.1 08/07/2015     08/07/2015    CO2 26 08/07/2015     Lab Results   Component Value Date    CHOL 95 08/07/2015    TRIG 69 08/07/2015    HDL 24 (L) 08/07/2015     BNP (pg/mL)   Date Value   07/14/2017 60   05/09/2016 35   11/03/2015 80     Creatinine (mg/dL)   Date Value   08/07/2015 0.82   07/16/2015 0.79   06/18/2015 0.72   05/28/2015 0.78     Magnesium (mg/dL)   Date Value   05/28/2015 2.2     LDL Calculated (mg/dL)   Date Value   08/07/2015 57     Lab Results   Component Value Date    WBC 3.4 (L) 08/07/2015    WBC 4.5 05/19/2015    HGB 10.9 (L) 05/09/2016    HCT 34.5 (L) 08/07/2015    MCV 86 08/07/2015     08/07/2015           Much or all of the text in this note was generated through the use of the Dragon Dictate voice-to-text software. Errors in spelling or words which seem out of context are unintentional. Sound alike errors, in particular, may have escaped editing.

## 2021-06-25 NOTE — TELEPHONE ENCOUNTER
Phone call to Brandyn non-invasive  who transf call to echo rickey Lawton who reviewed chart and stated CAILIN with fluoro needs to be sched in cath lab not cardiology dept.    Phone call to cath lab - spoke to margie Sheehan who confirmed procedure should be scheduled in cath lab by CV  who was then contacted with request.

## 2021-06-25 NOTE — TELEPHONE ENCOUNTER
----- Message from Adrian Davenport sent at 3/19/2019  3:10 PM CDT -----  Contact: Pt / Pt sister  General phone call:    Caller: Pt    Primary cardiologist: Dr Velásquez    Detailed reason for call: Pt is currently at [facility] -  They stated Pt may need an ECHO - advised Pt to stay overnight at hospital unless she was able to see Dr Velásquez tomorrow - which will not be possible ---- could you please reach out to the Pt's sister - Edith    New or active symptoms? Fluid - potential fluid in lungs, shortness of breath -- entered hospital this morning     Best phone number: [phone] for Pt  or [phone] for her sister  Best time to contact: any - today still if possible    Ok to leave a detailed message? Yes    Device? No    Additional Info:

## 2021-06-25 NOTE — TELEPHONE ENCOUNTER
Return call to patient - informed her that Dr. Velásquez spoke to ED physician and requested echo and f/u asap - echo order already in place - call transf to sched to arrange appts.  mg

## 2021-06-25 NOTE — PATIENT INSTRUCTIONS - HE
Chiquita Flores,    It was a pleasure to see you today at the [facility].     My recommendations after this visit include:  - Increase lasix to 40 mg daily.    - Continue to weigh yourself daily.   - Limit salt in your diet.   - Elevate your legs to decrease swelling.  - Follow up with me in 1 week.  - Please call if you have worsening swelling, shortness of breath, or increasing weights. [phone]    Marleny Phoenix, CNP

## 2021-06-25 NOTE — TELEPHONE ENCOUNTER
----- Message from Marleny Phoenix CNP sent at 3/20/2019  3:22 PM CDT -----  Regarding: FW: update on Chiquita  Please see note from Dr. Velásquez and order CAILIN and valve fluoro.  I will see the patient tomorrow and will discuss with her.  I will have the exit  coordinate scheduling if you could please put the order in from Dr. Velásquez.    Thanks     ----- Message -----  From: Jax Velásquez MD  Sent: 3/20/2019   2:43 PM  To: Nata Huber MD, Marleny Phoenix CNP, #  Subject: update on Chiquita                             Marleny,    I just met with Chiquita in the echo lab at [facility] after Nata Huber gave me an update about her new echo today.     I have known Chiquita for almost 20 years. She had [employer]'s mechanical aortic and mitral valves implanted at [place] in 1985 for rheumatic heart disease. She developed tricuspid regurgitation with cardiac ascites and Dr. Neo Teague put a tissue tricuspid valve in at the [facility] in 2015. She improved dramatically after that.    Her echo today shows a worsening aortic valve gradient (may be due to pannus) and worsening calcified mobile nodules on the ventricular side of the mitral valve. Her LV is good.    Chiquita reports URI symptoms since March 2 but now is getting dyspnea on exertion and new leg edema. She had been off her diuretic for quite some time as she did not need it. Her [facility] team sent her to the [facility] yesterday for these symptoms; the nice ER doctor at [facility] called me and put her back on a diuretic.     Both Dr. Huber and I feel she needs a CAILIN and I also would like to have her mechanical valves examined under fluoroscopy to evaluate their opening angle. Chiquita does not want to be admitted right now and is set to see you tomorrow. Please set her up for the CAILIN and valve fluoro as an outpatient at [facility] and adjust her medications as necessary. Call or text me with any questions.    Thanks,    Bill Velásquez

## 2021-06-25 NOTE — TELEPHONE ENCOUNTER
----- Message from Jax Velásquez MD sent at 3/19/2019  3:20 PM CDT -----  Regarding: needs an echo and visit with me or RAC or HF clinic  [facility] just called me. She needs an echo for edema and dyspnea and then a visit ASAP with me, RAC or HF NP.

## 2021-06-26 NOTE — PROGRESS NOTES
"Progress Notes by Jax Velásquez MD at 7/25/2018  8:10 AM     Author: Jax Velásquez MD Service: -- Author Type: Physician    Filed: 7/25/2018  8:52 AM Encounter Date: 7/25/2018 Status: Signed    : Jax Velásquez MD (Physician)           Click to link to [facility]     [facility] Clinic Note      Assessment:    1. Permanent atrial fibrillation, valvular, as a result of rheumatic heart disease (H)  Echo Complete    Holter Monitor    BNP(B-type Natriuretic Peptide)   2. Adult hypothyroidism     3. Dyslipidemia, goal LDL below 100         Plan:    1. Return to see Dr. Velásquez in 1 year after the testing noted above.    An After Visit Summary was printed and given to the patient.    Subjective:    Chiquita Flores returned for a planned annual follow up visit.    Chiquita states she does an exercise program in the fall and early winter at the [facility].  Otherwise, she does not have a regular exercise program.  She sometimes walks to the grocery store from her home.  She reports stable dyspnea on exertion if she carries more than 20 pounds uphill or up a flight of steps.  She does not experience chest discomfort, palpitations, lightheadedness, or orthopnea.    She takes her medications without difficulty or side effect.    Past Medical History:    Patient Active Problem List   Diagnosis   ? Permanent Atrial Fibrillation, valvular, as a result of rheumatic heart disease   ? Adult hypothyroidism   ? Dyslipidemia, goal LDL below 100       Past Medical History:   Diagnosis Date   ? Adult hypothyroidism 5/8/2009   ? Ascites 2015    due to hepatic congestion from severe TR   ? Bilateral pleural effusion 10/19/2015    Left was drained at [facility] per Dr. Celeste Gar; right not drained as right lung was \"trapped\" per pulmonary consultant there.   ? Cataract 11/3/2011   ? Cerebral infarct (H) 5/13/2015   ? Closed femur fracture (H) 2015   ? Dyslipidemia, goal LDL below 100  " "  ? Essential hypertension    ? History of right heart failure     due to severe TR as a result of rheumatic valve disease   ? History of transfusion    ? Nonocclusive coronary atherosclerosis of native coronary artery     minimal disease per angio report of Dr. Elias   ? Permanent Atrial Fibrillation, valvular, as a result of rheumatic heart disease    ? Rheumatic valvular disease    ? Secondary Pulmonary Hypertension     Mean pulmonary artery pressure was 28 mmHg at right heart catheterization performed in  by Dr. Vidal Elias; she has a history of rheumatic valvular heart disease with aortic and mitral valve mechanical prostheses implanted in  at \[facility]".    ? Tricuspid Regurgitation, now SP tricuspid valve replacement    ? Type 2 Diabetes Mellitus        Past Surgical History:   Procedure Laterality Date   ? MECHANICAL AORTIC AND MITRAL VALVE REPLACEMENT  1985    #21 [employer] aortic and #25 [employer] mitral done by a [facility] surgeon at \[facility]\""   ? LA PARTIAL HIP REPLACEMENT      Partial Hip Replacement With Prosthesis;  Proc Date: 2013;  Comments: ball prosthesis to treat hip fx   ? TRICUSPID VALVE REPLACEMENT  2015    29 mm [employer] Epic tissue valve by Dr. Neo Teague at [facility]        reports that she has never smoked. She has never used smokeless tobacco. She reports that she does not drink alcohol or use illicit drugs.    Family History   Problem Relation Age of Onset   ? Sudden death Father 55     no autopsy   ? Alzheimer's disease Sister    ? Other Sister       from burn injury while playing with matches   ? Other Mother      \"old age\", fell two months before her death and  in a nursing home   ? Osteoporosis Sister      living in [facility]   ? Arthritis Sister      lives in an apartment   ? No Medical Problems Sister    ? No Medical Problems Sister      she lives with [facility]       Outpatient Encounter Prescriptions " "as of 7/25/2018   Medication Sig Dispense Refill   ? alendronate (FOSAMAX) 70 MG tablet Take 1 tablet by mouth once a week.     ? amoxicillin (AMOXIL) 500 MG capsule Prior to the Dentist     ? atorvastatin (LIPITOR) 10 MG tablet Take 10 mg by mouth bedtime.     ? levothyroxine (SYNTHROID, LEVOTHROID) 112 MCG tablet Take 1 tablet by mouth daily.     ? metoprolol succinate (TOPROL-XL) 25 MG TAKE 2 TABLETS EVERY  tablet 1   ? warfarin (COUMADIN) 5 MG tablet Take 1 tabet by mouth 3 days per week and 1.5 tablets 4 days per week. Adjust dose based on INR results as directed.     ? [DISCONTINUED] calcium carbonate-vitamin D2 500 mg(1,250mg) -200 unit tablet Take 1 tablet by mouth 2 (two) times a day.     ? [DISCONTINUED] MULTIVITAMIN ORAL Take 1 tablet by mouth daily.       No facility-administered encounter medications on file as of 7/25/2018.        Review of Systems:     General: WNL  Eyes: WNL  Ears/Nose/Throat: WNL  Lungs: WNL  Heart: Shortness of Breath with activity  Stomach: WNL  Bladder: WNL  Muscle/Joints: WNL  Skin: WNL  Nervous System: WNL  Mental Health: WNL     Blood: WNL    Objective:     /70 (Patient Site: Left Arm, Patient Position: Sitting, Cuff Size: Adult Regular)  Pulse 68  Resp 24  Ht 5' 4\" (1.626 m)  Wt 116 lb (52.6 kg)  BMI 19.91 kg/m2  Wt Readings from Last 5 Encounters:   07/25/18 116 lb (52.6 kg)   07/16/18 111 lb (50.3 kg)   07/28/17 111 lb 12.8 oz (50.7 kg)   05/16/16 111 lb (50.3 kg)   11/03/15 106 lb (48.1 kg)        Physical Exam:    GENERAL APPEARANCE: alert, no apparent distress  EYES: no scleral icterus  NECK: no carotid bruits or adenopathy, jugular venous pressure is less than 5 cm  CHEST: symmetric, the lungs are clear to auscultation  CARDIOVASCULAR: irregular rhythm with prosthetic tones and a grade 2/6 systolic ejection murmur  EXTREMITIES: no cyanosis, clubbing; trace left leg edema  SKIN: no xanthelasma  NEUROLOGIC: normal coordination; she uses a cane for " ambulation  PSYCHIATRIC: mood and affect are normal    Cardiac Testing:    Echocardiogram:   Results for orders placed during the hospital encounter of 07/16/18   Echo Complete [ECH10] 07/16/2018    Narrative   Left Ventricle: Normal left ventricular size and systolic function.The   estimated left ventricular ejection fraction is 60%.    Right Ventricle: Normal right ventricular size. The systolic function is   mildly reduced. TAPSE is abnormal (1.1).    Left Atrium: Left atrial volume is mildly increased. Right Atrium:   Cavity is severely dilated.    Aortic Valve: There is a mechanical valve present with moderate   prosthetic stenosis (Mean gradient: 35 mm Hg, Peak tony:3.9 m/sec, DVI:0.2,   AT:110 ms)    Mitral Valve: Mechanical mitral valve prosthesis is well seated. There   is mild prosthetic valve stensosis with moderate pannus formation (mean   gradient: 6 mm Hg, Peak Tony:1.9 m/sec, HR 76 bpm)    Tricuspid Valve: bioprosthetic valve no significant stenosis (mean   gradient: 5 mm Hg, peak tony: 1.6 m/sec, PHT:164 ms).    When compared to the previous study dated 7/14/2017, no significant   change.          Cardiac Catheterization:   Results for orders placed during the hospital encounter of 08/07/15   CV RIGHT HEART CATH/CORONARY ANGIOGRAM [CATH77] 08/07/2015    Narrative Cardiac Diagnostic Report     Demographics      Patient Name LUDWIG LOPEZ  Referring Physician      MRN #        [id number]          Diagnostic Physician   ILDA CASEY MD      Account #    [id number]           Interventional                                   Physician      MARS        1945         Report Status:      Date of      08/07/2015 12:17   Study        PM     Procedure     Procedure Type      Diagnostic procedure:Right Heart Cath, Coronary Angiogram, I   Stat, RIGHT HEART CATH/COROANRY ANGIOGRAM      Conclusions      Procedure Summary   No significant CAD      Known severe tricuspid regurgitation, confirmed by    catheterization   RA 19, v waves of 31mmHg   PA 41/21/28      CO 2.1   CI 1.3      LVedp not directly measured due to mechanical AVR and MVR      Recommendations   CTS consult for TV repair/replacement      Signatures      Electronically signed by ILDA CASEY MD   (Diagnostic Physician) on 08/07/2015 at 02:09 PM     Angiographic Findings      Diagnostic Findings      Cardiac Arteries and Lesion Findings     LMCA: Minimal disease.    LAD: Minimal disease.    LCx: Minimal disease.    RCA: Minimal disease.    Procedure Data  Procedure Date  Date: 08/07/2015Start: 12:17 PMEnd: 01:58 PM    The procedure was explained in detail to the patient. Risks, complications  and alternative treatments were reviewed. Written consent was obtained.    Diagnostic Cath Status: Elective    Entry Locations    - Percutaneous access was performed through the Right Radial artery. A 5      Fr sheath was inserted.      - Percutaneous access was performed through the Right Femoral vein. A 7 Fr      sheath was inserted. This was exchanged for a 7 Fr sheath.      - Percutaneous access was performed through the Right Femoral artery. A 4      Fr sheath was inserted.    Procedure Medications    - Fentanyl I.V. /per verbal M.D. order 12.5 mcg.      - Midazolam (Versed) I.V. /per verbal M.D. order 0.25 mg.      - Nitroglycerin Spray 1 Sprays.      - Radial Artery Cocktail (nicardipine, nitroglycerine,heparin) I.A ./per      verbal M.D. order 1 Dose contains 100 mcgs / 100 mcgs / 3000 Units.      - Fentanyl I.V. /per verbal M.D. order 12.5 mcg.      - Midazolam (Versed) I.V. /per verbal M.D. order 0.25 mg.    Diagnostic Catheters    - PowerMetal Technologiesedtronic G-5 Fr PAPAwas used for:Left coronary angiography.      - ACordis 4.0 Fr JR 4.0was used for:Left coronary angiography.      - ACordis 4.0 Fr JR 4.0was used for:Right heart cath.      - ACordis 4.0 Fr IMAwas used for:Right heart cath.      - ACook 5 Fr C2 65cm Candia Tip Torcon NBwas used for:Right heart  cath.      - ACordis 4.0 Fr JL 4.0was used for:Left coronary angiography.      - ACordis 4.0 Fr JR 4.0was used for:Right coronary angiography.    Contrast Material    - Omnipaque 350 mgl/ml45 ml    Medical History     Risk Factors      The patient risk factors include:last creatinine 0.8.     Admission Data  Admission Date: 08/07/2015 Admission Time: 10:23 AM    Hemodynamics      Condition: Rest      O2 Consumption: Estimated: 149.41Heart Rate: 74 bpm     Oxygen Saturation  +--------+-----+----+----------------------+---+---------------------------+  !Location!pCO2 !pO2 !% Saturation          !Hgb!O2 Content                 !  +--------+-----+----+----------------------+---+---------------------------+  !PA      !     !    !53                    !   !                           !  +--------+-----+----+----------------------+---+---------------------------+  !AO      !     !    !100                   !   !                           !  +--------+-----+----+----------------------+---+---------------------------+    Pressures (mmHg)  +-----+--------------------------------------------------------------------+  !Site !Pressure                                                            !  +-----+--------------------------------------------------------------------+  !PA   !41/21 (28)                                                          !  +-----+--------------------------------------------------------------------+  !PA   !41/23 (28)                                                          !  +-----+--------------------------------------------------------------------+  !PA   !40/21 (28)                                                          !  +-----+--------------------------------------------------------------------+  !RV   !38/4 ,18                                                            !  +-----+--------------------------------------------------------------------+  !RA   !17/29 (20)                                                           !  +-----+--------------------------------------------------------------------+  !RA   !19/29 (21)                                                          !  +-----+--------------------------------------------------------------------+  !AO   !94/65 (79)                                                          !  +-----+--------------------------------------------------------------------+  !AO   !104/52 (71)                                                         !  +-----+--------------------------------------------------------------------+    Cardiac Output  +------+-----------------------+--------------------------+----------------+  !Method!CO (l/min)             !CI (l/min/m2)             !SV (ml)         !  +------+-----------------------+--------------------------+----------------+  !Antonella  !2.11                   !1.3                       !28.46           !  +------+-----------------------+--------------------------+----------------+    Shunts    Oxygen Values      O2 Capacity 150.96 O2 Consumption 149.41      Flows (l/min)      Qs 2.11     Vascular Resistance    +-----------------------------------+-----+-----+----+---+---------+-------+  !CO method                          !TSVR !SVR  !TPVR!PVR!TPVR/TSVR!PVR/SVR!  +-----------------------------------+-----+-----+----+---+---------+-------+  !Qp or Qs                           !33.53!23.79!    !   !         !       !  +-----------------------------------+-----+-----+----+---+---------+-------+  !Antonella                               !33.53!23.79!13.4!   !0.4      !       !  +-----------------------------------+-----+-----+----+---+---------+-------+  Discharge Data    Hospital Status: Outpatient       Imaging:    No results found.    Lab Results:    Lab Results   Component Value Date    CREATININE 0.82 08/07/2015    BUN 16 08/07/2015     08/07/2015    K 4.1 08/07/2015     08/07/2015    CO2 26 08/07/2015     Lab Results    Component Value Date    CHOL 95 08/07/2015    TRIG 69 08/07/2015    HDL 24 (L) 08/07/2015     BNP (pg/mL)   Date Value   07/16/2018 68   07/14/2017 60   05/09/2016 35     Creatinine (mg/dL)   Date Value   08/07/2015 0.82   07/16/2015 0.79   06/18/2015 0.72   05/28/2015 0.78     Magnesium (mg/dL)   Date Value   05/28/2015 2.2     LDL Calculated (mg/dL)   Date Value   08/07/2015 57     Lab Results   Component Value Date    WBC 3.4 (L) 08/07/2015    WBC 4.5 05/19/2015    HGB 11.8 (L) 07/28/2017    HCT 34.5 (L) 08/07/2015    MCV 86 08/07/2015     08/07/2015           Much or all of the text in this note was generated through the use of the Dragon Dictate voice-to-text software. Errors in spelling or words which seem out of context are unintentional. Sound alike errors, in particular, may have escaped editing.

## 2021-06-27 NOTE — PROGRESS NOTES
Progress Notes by Jax Velásquez MD at 5/21/2019  9:30 AM     Author: Jax Velásquez MD Service: -- Author Type: Physician    Filed: 5/21/2019 10:01 AM Encounter Date: 5/21/2019 Status: Signed    : Jax Velásquez MD (Physician)           Click to link to [facility]     [facility] Clinic Note      Assessment:    1. Permanent atrial fibrillation (H)     2. Dyslipidemia, goal LDL below 100         Plan:    1. Continue current cardiovascular medical therapy.  Resuming furosemide 20 mg every other day has controlled her edema.  Careful inspection of her prosthetic valves by fluoroscopy and transesophageal echocardiography did not reveal any changes or valvular dysfunction when compared with prior testing.  2. Return to see Dr. Velásquez in October after a new basic metabolic panel.  She will discuss her mild anemia with her primary care team.    An After Visit Summary was printed and given to the patient.    Subjective:    Chiquita Flores returned for a planned follow up visit.    She underwent diagnostic testing, as noted below, that confirmed normal mechanical valve leaflet motion.  The mass associated with the mitral valve on transthoracic echocardiography was felt to represent retained chordae tendinae on transesophageal echocardiography.  She has a relatively high transaortic valve gradient which is probably due to valve/patient mismatch.  The older generation mechanical valves, in particular, are known to have relatively high gradients.    Taking furosemide every other day has controlled her edema.  She weighs herself every day and her weights have come down and are stable.  She had been on a diuretic for many years but was off of it for 2 years after her last tricuspid valve surgery.    She and her sister live in an apartment together and do not have air conditioning.  She states they will open windows and sit in front of the fan on the few warmest days of the year.    Past  "Medical History:    Patient Active Problem List   Diagnosis   ? Permanent Atrial Fibrillation, valvular, as a result of rheumatic heart disease   ? Adult hypothyroidism   ? Dyslipidemia, goal LDL below 100       Past Medical History:   Diagnosis Date   ? Adult hypothyroidism 5/8/2009   ? Ascites 2015    due to hepatic congestion from severe TR   ? Bilateral pleural effusion 10/19/2015    Left was drained at [facility] per Dr. Celeste Gar; right not drained as right lung was \"trapped\" per pulmonary consultant there.   ? Cataract 11/3/2011   ? Cerebral infarct (H) 5/13/2015   ? Closed femur fracture (H) 2015   ? Dyslipidemia, goal LDL below 100    ? Essential hypertension    ? History of right heart failure 2015    due to severe TR as a result of rheumatic valve disease   ? History of transfusion    ? Nonocclusive coronary atherosclerosis of native coronary artery 2015    minimal disease per angio report of Dr. Elias   ? Permanent Atrial Fibrillation, valvular, as a result of rheumatic heart disease    ? Rheumatic valvular disease 1985   ? Secondary Pulmonary Hypertension     Mean pulmonary artery pressure was 28 mmHg at right heart catheterization performed in 2015 by Dr. Vidal Elias; she has a history of rheumatic valvular heart disease with aortic and mitral valve mechanical prostheses implanted in 1985 at [facility].    ? Tricuspid Regurgitation, now SP tricuspid valve replacement 2011   ? Type 2 Diabetes Mellitus        Past Surgical History:   Procedure Laterality Date   ? MECHANICAL AORTIC AND MITRAL VALVE REPLACEMENT  02/08/1985    #21 [employer]. Gurwinder aortic and #25 [employer]. Gurwinder mitral done by a [facility] surgeon at [facility]   ? IA PARTIAL HIP REPLACEMENT  2013    ball prosthesis to treat hip fx   ? TRICUSPID VALVE REPLACEMENT  9/1/2015    29 mm St. Gurwinder Epic tissue valve by Dr. Neo Teague at [facility]        reports that she has never smoked. She has never used smokeless tobacco. She reports that she does not drink " "alcohol or use drugs.    Family History   Problem Relation Age of Onset   ? Sudden death Father 55        no autopsy   ? Alzheimer's disease Sister    ? Other Sister          from burn injury while playing with matches   ? Other Mother         \"old age\", fell two months before her death and  in a nursing home   ? Osteoporosis Sister         living in [facility]   ? Arthritis Sister         lives in an apartment   ? No Medical Problems Sister    ? No Medical Problems Sister         she lives with Chiquita       Outpatient Encounter Medications as of 2019   Medication Sig Dispense Refill   ? alendronate (FOSAMAX) 70 MG tablet Take 1 tablet by mouth once a week.     ? amoxicillin (AMOXIL) 500 MG capsule Prior to the Dentist     ? atorvastatin (LIPITOR) 10 MG tablet Take 10 mg by mouth bedtime.     ? furosemide (LASIX) 20 MG tablet Take 1 tablet (20 mg total) by mouth every other day. 60 tablet 11   ? levothyroxine (SYNTHROID, LEVOTHROID) 112 MCG tablet Take 1 tablet by mouth daily.     ? metoprolol succinate (TOPROL-XL) 25 MG Take 2 tablets (50 mg total) by mouth daily. 20 tablet 1   ? warfarin (COUMADIN) 5 MG tablet Take 1 tabet by mouth 3 days per week and 1.5 tablets 4 days per week. Adjust dose based on INR results as directed.       No facility-administered encounter medications on file as of 2019.        Review of Systems:     General: WNL  Eyes: WNL     Lungs: Shortness of Breath  Heart: Shortness of Breath with activity  Stomach: WNL  Bladder: WNL  Muscle/Joints: WNL  Skin: WNL  Nervous System: WNL  Mental Health: WNL     Blood: WNL    Objective:     /62 (Patient Site: Left Arm, Patient Position: Sitting, Cuff Size: Adult Regular)   Pulse 64   Resp 16   Ht 5' 3\" (1.6 m)   Wt 112 lb 9.6 oz (51.1 kg)   BMI 19.95 kg/m    Wt Readings from Last 5 Encounters:   19 112 lb 9.6 oz (51.1 kg)   19 109 lb 5 oz (49.6 kg)   19 113 lb (51.3 kg)   19 118 " lb (53.5 kg)   03/20/19 116 lb (52.6 kg)        Physical Exam:    GENERAL APPEARANCE: alert, no apparent distress  EYES: no scleral icterus  NECK: no carotid bruits or adenopathy, jugular venous pressure is 6 cm  CHEST: symmetric, the lungs are clear to auscultation  CARDIOVASCULAR: regular rhythm with clear prosthetic tones and a soft systolic murmur   EXTREMITIES: no cyanosis, clubbing or edema  SKIN: no xanthelasma  NEUROLOGIC: normal gait and coordination  PSYCHIATRIC: mood and affect are normal    Cardiac Testing:    Echocardiogram:   Results for orders placed during the hospital encounter of 04/05/19   Echo CAILIN [ECH01] 04/05/2019    Addendum   Transesophageal in combination with limited transthoracic echocardiogram  was performed.   Normal left ventricular size. Moderate left ventricular hypertrophy   Left ventricle ejection fraction is normal. Left ventricular ejection  fraction estimated 60-65%   Normal right ventricular size and systolic function.   Tilting-disc mechanical mitral valve prosthesis. Valve leaflets appear  to open normally. Mean gradient of 4 mmHg which which is within normal  limits.   Mobile echodensities associated with the mitral valve most likely  consistent with retained chordae.   Tilting-disc mechanical valve in aortic position. Leaflets not fully  visualized.. Elevated peak velocity. peak gradient and mean gradient . The  peak velocity is 3.4 m/s with a mean gradient of 28 mmHg. Reduced DVI.   Bioprosthetic tricuspid valve. Leaflets appear thin with normal opening  excursion. Mean gradient 4 mmHg   Biatrial enlargement   Left atrial appendage with dense spontaneous contrast. Possible small  thrombus.   When compared to prior studies. Mean gradient on multiple studies dating  back to 2015 range from 26 mmHg to 37 mmHg. Potential findings represent  small size of the prosthetic aortic valve, although some degree of  prosthetic aortic valve dysfunction is not excluded   Please see  separate fluoroscopy report from the same day        Anrde Bagley MD 4/6/2019  4:30 PM          Narrative   Transesophageal in combination with limited transthoracic echocardiogram   was performed.    Normal left ventricular size. Moderate left ventricular hypertrophy    Left ventricle ejection fraction is normal. Left ventricular ejection   fraction estimated 60-65%    Normal right ventricular size and systolic function.    Tilting-disc mechanical mitral valve prosthesis. Valve leaflets appear   to open normally. Mean gradient of 4 mmHg which which is within normal   limits.    Mobile echodensities associated with the mitral valve most likely   consistent with retained chordae.    Tilting-disc mechanical valve in aortic position. Leaflets not fully   visualized.. Elevated peak velocity. peak gradient and mean gradient . The   peak velocity is 3.4 m/s with a mean gradient of 28 mmHg. Reduced DVI.    Bioprosthetic tricuspid valve. Leaflets appear thin with normal opening   excursion. Mean gradient 4 mmHg    Biatrial enlargement    When compared to prior studies. Mean gradient on multiple studies dating   back to 2015 range from 26 mmHg to 37 mmHg. Potential findings represent   small size of the prosthetic aortic valve, although some degree of   prosthetic aortic valve dysfunction is not excluded    Please see separate fluoroscopy report from the same day          Cardiac Catheterization:   Results for orders placed during the hospital encounter of 04/05/19   Cardiac Catheterization [CATH01] 04/05/2019    Narrative Mechanical AV - St. Gurwinder - difficult to visualize, but both valves appear   to move approriately    Mechanical MV - St. Gurwinder - both valves open appropriately - noted   intermittently one valve leaflet opens before or after the other valve -   can be either valve.   No sign either leaflet is obstructed or does not   open or close completely.             Results for orders placed in visit on 03/20/19    Echo Stress Exercise [ECH07]        Imaging:    No results found.    Lab Results:    Lab Results   Component Value Date    CREATININE 0.73 05/14/2019    BUN 17 05/14/2019     05/14/2019    K 4.7 05/14/2019     05/14/2019    CO2 28 05/14/2019     Lab Results   Component Value Date    CHOL 95 08/07/2015    TRIG 69 08/07/2015    HDL 24 (L) 08/07/2015     BNP (pg/mL)   Date Value   05/14/2019 55   07/16/2018 68   07/14/2017 60     Creatinine (mg/dL)   Date Value   05/14/2019 0.73   04/02/2019 0.72   03/21/2019 0.68   08/07/2015 0.82     Magnesium (mg/dL)   Date Value   05/14/2019 2.2   05/28/2015 2.2     LDL Calculated (mg/dL)   Date Value   08/07/2015 57     Lab Results   Component Value Date    WBC 3.4 (L) 08/07/2015    WBC 4.5 05/19/2015    HGB 11.6 (L) 05/14/2019    HCT 34.5 (L) 08/07/2015    MCV 86 08/07/2015     08/07/2015           Much or all of the text in this note was generated through the use of the Dragon Dictate voice-to-text software. Errors in spelling or words which seem out of context are unintentional. Sound alike errors, in particular, may have escaped editing.

## 2021-06-27 NOTE — PROGRESS NOTES
Progress Notes by Marleny Phoenix CNP at 4/2/2019  2:50 PM     Author: Marleny Phoenix CNP Service: -- Author Type: Nurse Practitioner    Filed: 4/2/2019  3:28 PM Encounter Date: 4/2/2019 Status: Signed    : Marleny Phoenix CNP (Nurse Practitioner)           Click to link to [facility] NOTE      Assessment/Recommendations   Assessment:    1.  Heart failure with preserved ejection fraction: Echocardiogram showed grade 3 diastolic dysfunction and she had an elevated BNP.  She responded to starting Lasix.  Exacerbation may be due to URI she had for 3 weeks during March.    2.  Valvular heart disease: History of mechanical mitral valve replacement and mechanical aortic valve replacement in 1985 due to rheumatic heart disease.  Bioprosthetic tricuspid valve replacement in 2015.  Echocardiogram shows worsening AV gradient and worsening calcified mobile nodules of mitral valve.  She will have a CAILIN with fluoro on April 5 to further evaluate.    3.  Permanent atrial fibrillation: She continues to take warfarin.    Plan:  1.  Continue Lasix 20 mg daily.  2.  BMP pending  3.  CAILIN with fluoro on April 5     History of Present Illness    Ms. Chiquita Flores is a 74 y.o. female seen at [facility] heart failure clinic today for continued follow-up.  She has a history of mechanical mitral valve replacement and mechanical aortic valve replacement due to rheumatic heart disease in 1985.  She had bioprosthetic tricuspid valve replacement in 2015.  She also has a history of permanent atrial fibrillation and dyslipidemia.  She was evaluated at [facility] emergency room on March 19 due to edema, weight gain, and shortness of breath.  CT showed right pleural effusion.  She was started on Lasix.  Echocardiogram on March 20, 2019 showed ejection fraction of 74%, grade 3 diastolic dysfunction, decreased right ventricular function, worsening AV gradient, and  worsening calcified mobile nodules of mitral valve.    She was seen in clinic on March 21 and Lasix was increased to 40 mg daily.  Lasix was decreased to 20 mg daily on March 25 after she has lost 7 pounds.  She no longer had shortness of breath or edema.  Today, she continues to feel well with no shortness of breath.  Her weight has been stable between 110-111 pounds for the past week.  She denies fatigue, lightheadedness and chest pain.      ECHO:   Results for orders placed during the hospital encounter of 03/20/19   Echo Complete [ECH10] 03/20/2019    Narrative   Severe biatrial chamber enlargement    Left ventricle ejection fraction is normal. The calculated left   ventricular ejection fraction is 74% without wall motion abnormality.    Grade 3 diastolic dysfunction    Normal right ventricular size with decreased systolic function.    There is a mechanical mitral valve prosthesis. There are several mobile   echodensities along the mitral valve sewing ring. Cannot exclude   vegetation or thrombus. There is also a highly mobile echodensity noted on   the ventricular side of the mitral valve; unclear whether this is attached   to the aortic or mitral valve apparatus. The prosthetic valve is abnormal.   Mean gradient across the mitral valve is 7 mmHg. Unable to assess mitral   regurgitation.    There is a bileaflet tilting disc mechanical valve present. There is   mild central insufficiency present. The prosthetic valve is abnormal. Mean   gradient across the aortic valve is 43 mmHg with an estimated valve area   of 0.5 cm .    When compared to the previous study dated 7/16/2018, there has been an   increase in the gradients across both aortic and mitral prosthetic valves.   Prominent mobile echodensities are now identified. Recommend 3D   transesophageal echo for further evaluation.           Physical Examination Review of Systems   Vitals:    04/02/19 1452   BP: 100/60   Pulse: 66   Resp: 14     Body mass index  "is 19.4 kg/m .  Wt Readings from Last 3 Encounters:   04/02/19 113 lb (51.3 kg)   03/21/19 118 lb (53.5 kg)   03/20/19 116 lb (52.6 kg)       General Appearance:     Alert, cooperative and in no acute distress.   ENT/Mouth: membranes moist, no oral lesions or bleeding gums.      EYES:  no scleral icterus, normal conjunctivae   Chest/Lungs:   lungs are clear to auscultation, no rales or wheezing, respirations unlabored   Cardiovascular:    Irregularly irregular.  Normal first and second heart sounds, mechanical click, trace edema bilateral ankles   Abdomen:  Soft, nontender, nondistended, bowel sounds present   Extremities: no cyanosis or clubbing   Skin: warm, dry.    Neurologic: mood and affect are appropriate, alert and oriented x3      General: WNL  Eyes: WNL  Ears/Nose/Throat: WNL  Lungs: WNL  Heart: WNL  Stomach: WNL  Bladder: WNL  Muscle/Joints: WNL  Skin: WNL  Nervous System: WNL  Mental Health: WNL     Blood: WNL     Medical History  Surgical History Family History Social History   Past Medical History:   Diagnosis Date   ? Adult hypothyroidism 5/8/2009   ? Ascites 2015    due to hepatic congestion from severe TR   ? Bilateral pleural effusion 10/19/2015    Left was drained at [facility] per Dr. Celeste Gar; right not drained as right lung was \"trapped\" per pulmonary consultant there.   ? Cataract 11/3/2011   ? Cerebral infarct (H) 5/13/2015   ? Closed femur fracture (H) 2015   ? Dyslipidemia, goal LDL below 100    ? Essential hypertension    ? History of right heart failure 2015    due to severe TR as a result of rheumatic valve disease   ? History of transfusion    ? Nonocclusive coronary atherosclerosis of native coronary artery 2015    minimal disease per angio report of Dr. Elias   ? Permanent Atrial Fibrillation, valvular, as a result of rheumatic heart disease    ? Rheumatic valvular disease 1985   ? Secondary Pulmonary Hypertension     Mean pulmonary artery pressure was 28 mmHg at right heart " "catheterization performed in  by Dr. Vidal Elias; she has a history of rheumatic valvular heart disease with aortic and mitral valve mechanical prostheses implanted in  at [facility].    ? Tricuspid Regurgitation, now SP tricuspid valve replacement    ? Type 2 Diabetes Mellitus     Past Surgical History:   Procedure Laterality Date   ? MECHANICAL AORTIC AND MITRAL VALVE REPLACEMENT  1985    #21 [address]. Gurwinder aortic and #25 [address]. Gurwinder mitral done by a [facility] surgeon at [facility]   ? MD PARTIAL HIP REPLACEMENT      Partial Hip Replacement With Prosthesis;  Proc Date: 2013;  Comments: ball prosthesis to treat hip fx   ? TRICUSPID VALVE REPLACEMENT  2015    29 mm St. Gurwinder Epic tissue valve by Dr. Neo Teague at [facility]    Family History   Problem Relation Age of Onset   ? Sudden death Father 55        no autopsy   ? Alzheimer's disease Sister    ? Other Sister          from burn injury while playing with matches   ? Other Mother         \"old age\", fell two months before her death and  in a nursing home   ? Osteoporosis Sister         living in [facility]   ? Arthritis Sister         lives in an apartment   ? No Medical Problems Sister    ? No Medical Problems Sister         she lives with [facility]    Social History     Socioeconomic History   ? Marital status: Single     Spouse name: Not on file   ? Number of children: 0   ? Years of education: Not on file   ? Highest education level: Not on file   Occupational History   ? Occupation: book keeping     Employer: RETIRED     Comment: retired at    Social Needs   ? Financial resource strain: Not on file   ? Food insecurity:     Worry: Not on file     Inability: Not on file   ? Transportation needs:     Medical: Not on file     Non-medical: Not on file   Tobacco Use   ? Smoking status: Never Smoker   ? Smokeless tobacco: Never Used   Substance and Sexual Activity   ? Alcohol use: No   ? Drug use: No   ? " Sexual activity: No   Lifestyle   ? Physical activity:     Days per week: Not on file     Minutes per session: Not on file   ? Stress: Not on file   Relationships   ? Social connections:     Talks on phone: Not on file     Gets together: Not on file     Attends Mandaeism service: Not on file     Active member of club or organization: Not on file     Attends meetings of clubs or organizations: Not on file     Relationship status: Not on file   ? Intimate partner violence:     Fear of current or ex partner: Not on file     Emotionally abused: Not on file     Physically abused: Not on file     Forced sexual activity: Not on file   Other Topics Concern   ? Not on file   Social History Narrative    Lives with her youngest sister, Edith, in a home they jointly own.          Medications  Allergies   Current Outpatient Medications   Medication Sig Dispense Refill   ? alendronate (FOSAMAX) 70 MG tablet Take 1 tablet by mouth once a week.     ? atorvastatin (LIPITOR) 10 MG tablet Take 10 mg by mouth bedtime.     ? furosemide (LASIX) 20 MG tablet Take 1 tablet (20 mg total) by mouth daily. 60 tablet 11   ? levothyroxine (SYNTHROID, LEVOTHROID) 112 MCG tablet Take 1 tablet by mouth daily.     ? metoprolol succinate (TOPROL-XL) 25 MG Take 2 tablets (50 mg total) by mouth daily. 20 tablet 1   ? warfarin (COUMADIN) 5 MG tablet Take 1 tabet by mouth 3 days per week and 1.5 tablets 4 days per week. Adjust dose based on INR results as directed.     ? amoxicillin (AMOXIL) 500 MG capsule Prior to the Dentist       No current facility-administered medications for this visit.       Allergies   Allergen Reactions   ? Aspirin          Lab Results    Chemistry CBC BNP   Lab Results   Component Value Date    CREATININE 0.68 03/21/2019    BUN 13 03/21/2019     03/21/2019    K 4.3 03/21/2019     03/21/2019    CO2 30 03/21/2019     Creatinine (mg/dL)   Date Value   03/21/2019 0.68   08/07/2015 0.82   07/16/2015 0.79   06/18/2015  0.72    Lab Results   Component Value Date    WBC 3.4 (L) 08/07/2015    HGB 11.8 (L) 07/28/2017    HCT 34.5 (L) 08/07/2015    MCV 86 08/07/2015     08/07/2015    Lab Results   Component Value Date    BNP 68 07/16/2018     BNP (pg/mL)   Date Value   07/16/2018 68   07/14/2017 60   05/09/2016 35          Marleny Phoenix CNP  [facility]   Heart Failure Clinic

## 2021-06-27 NOTE — PROGRESS NOTES
Progress Notes by Marleny Phoenix CNP at 3/21/2019 12:50 PM     Author: Marleny Phoenix CNP Service: -- Author Type: Nurse Practitioner    Filed: 3/21/2019  2:03 PM Encounter Date: 3/21/2019 Status: Signed    : Marleny Phoenix CNP (Nurse Practitioner)           Click to link to [facility] HEART [facility] NOTE      Assessment/Recommendations   Assessment:    1.  Heart failure with preserved ejection fraction: Echocardiogram showed grade 3 diastolic dysfunction.  BNP was normal when checked at [facility].  She has lower extremity edema, weight gain, shortness of breath, and crackles in the right lower lobe.    2.  Valvular heart disease: History of mechanical mitral valve replacement and mechanical aortic valve replacement in 1985 due to rheumatic heart disease.  Bioprosthetic tricuspid valve replacement in 2015.  Echocardiogram done yesterday shows worsening AV gradient and worsening calcified mobile nodules of mitral valve.  Dr. Velásquez has ordered CAILIN with fluoro further evaluate.  She continues to take warfarin and has her INR checked regularly.    3.  Permanent atrial fibrillation: Heart rate controlled.  She continues to take metoprolol and warfarin.    Plan:  1.  BMP pending  2.  Increase Lasix to 40 mg daily  3.  Low-sodium diet and daily weights    Chiquita Flores will follow up in the heart failure clinic in 1 week.     History of Present Illness    Ms. Chiquita Flores is a 74 y.o. female seen at [facility] heart failure clinic today for follow-up.  She has a history of mechanical mitral valve replacement and mechanical aortic valve replacement due to rheumatic heart disease in 1985.  She had bioprosthetic tricuspid valve replacement in 2015.  She also has a history of permanent atrial fibrillation and dyslipidemia.  She was seen at [facility] on March 19 due to edema, weight gain, and shortness of breath.  CT showed right pleural effusion.   Thoracentesis was recommended but patient refused hospitalization.  She was started on Lasix 20 mg daily.  Echocardiogram done March 20, 2019 which showed ejection fraction of 74%, grade 3 diastolic dysfunction, decreased right ventricular function, worsening AV gradient, and worsening calcified mobile nodules of mitral valve.  Dr. Velásquez has recommended CAILIN with fluoro.     She has had an upper respiratory infection for the past few weeks with congested cough.  She denies any fevers or chills.  She has gained about 5 pounds in the past week.  Home weight is now 118 pounds.  She has had increased shortness of breath for the past week but denies orthopnea.  She has lower extremity edema.  She denies lightheadedness and chest pain.      She is already following a low-sodium diet.    ECHO:   Results for orders placed during the hospital encounter of 03/20/19   Echo Complete [ECH10] 03/20/2019    Narrative   Severe biatrial chamber enlargement    Left ventricle ejection fraction is normal. The calculated left   ventricular ejection fraction is 74% without wall motion abnormality.    Grade 3 diastolic dysfunction    Normal right ventricular size with decreased systolic function.    There is a mechanical mitral valve prosthesis. There are several mobile   echodensities along the mitral valve sewing ring. Cannot exclude   vegetation or thrombus. There is also a highly mobile echodensity noted on   the ventricular side of the mitral valve; unclear whether this is attached   to the aortic or mitral valve apparatus. The prosthetic valve is abnormal.   Mean gradient across the mitral valve is 7 mmHg. Unable to assess mitral   regurgitation.    There is a bileaflet tilting disc mechanical valve present. There is   mild central insufficiency present. The prosthetic valve is abnormal. Mean   gradient across the aortic valve is 43 mmHg with an estimated valve area   of 0.5 cm .    When compared to the previous study dated  "7/16/2018, there has been an   increase in the gradients across both aortic and mitral prosthetic valves.   Prominent mobile echodensities are now identified. Recommend 3D   transesophageal echo for further evaluation.           Physical Examination Review of Systems   Vitals:    03/21/19 1253   BP: 118/60   Pulse: (!) 58   Resp: 22     Body mass index is 20.25 kg/m .  Wt Readings from Last 3 Encounters:   03/21/19 118 lb (53.5 kg)   03/20/19 116 lb (52.6 kg)   07/25/18 116 lb (52.6 kg)       General Appearance:     Alert, cooperative and in no acute distress.   ENT/Mouth: membranes moist, no oral lesions or bleeding gums.      EYES:  no scleral icterus, normal conjunctivae   Chest/Lungs:    Crackles right lower lobe   Cardiovascular:    Irregularly irregular. Normal first and second heart sounds with mechanical click, 2+ edema bilateral lower extremities extending from feet to knees   Abdomen:  Soft, nontender, nondistended, bowel sounds present   Extremities: no cyanosis or clubbing   Skin: warm, dry.    Neurologic: mood and affect are appropriate, alert and oriented x3      General: WNL  Eyes: WNL  Ears/Nose/Throat: WNL  Lungs: Shortness of Breath  Heart: Shortness of Breath with activity  Stomach: WNL  Bladder: WNL  Muscle/Joints: WNL  Skin: WNL  Nervous System: WNL  Mental Health: WNL     Blood: WNL     Medical History  Surgical History Family History Social History   Past Medical History:   Diagnosis Date   ? Adult hypothyroidism 5/8/2009   ? Ascites 2015    due to hepatic congestion from severe TR   ? Bilateral pleural effusion 10/19/2015    Left was drained at [facility] per Dr. Celeste Gar; right not drained as right lung was \"trapped\" per pulmonary consultant there.   ? Cataract 11/3/2011   ? Cerebral infarct (H) 5/13/2015   ? Closed femur fracture (H) 2015   ? Dyslipidemia, goal LDL below 100    ? Essential hypertension    ? History of right heart failure 2015    due to severe TR as a result of rheumatic valve " "disease   ? History of transfusion    ? Nonocclusive coronary atherosclerosis of native coronary artery     minimal disease per angio report of Dr. Elias   ? Permanent Atrial Fibrillation, valvular, as a result of rheumatic heart disease    ? Rheumatic valvular disease    ? Secondary Pulmonary Hypertension     Mean pulmonary artery pressure was 28 mmHg at right heart catheterization performed in  by Dr. Vidal Elias; she has a history of rheumatic valvular heart disease with aortic and mitral valve mechanical prostheses implanted in  at [facility].    ? Tricuspid Regurgitation, now SP tricuspid valve replacement    ? Type 2 Diabetes Mellitus     Past Surgical History:   Procedure Laterality Date   ? MECHANICAL AORTIC AND MITRAL VALVE REPLACEMENT  1985    #21 [address] Gurwinder aortic and #25 [address]. Gurwinder mitral done by a [facility] surgeon at [facility]   ? ND PARTIAL HIP REPLACEMENT      Partial Hip Replacement With Prosthesis;  Proc Date: 2013;  Comments: ball prosthesis to treat hip fx   ? TRICUSPID VALVE REPLACEMENT  2015    29 mm St. Gurwinder Epic tissue valve by Dr. Neo Teague at [facility]    Family History   Problem Relation Age of Onset   ? Sudden death Father 55        no autopsy   ? Alzheimer's disease Sister    ? Other Sister          from burn injury while playing with matches   ? Other Mother         \"old age\", fell two months before her death and  in a nursing home   ? Osteoporosis Sister         living in [facility]   ? Arthritis Sister         lives in an apartment   ? No Medical Problems Sister    ? No Medical Problems Sister         she lives with [facility]    Social History     Socioeconomic History   ? Marital status: Single     Spouse name: Not on file   ? Number of children: 0   ? Years of education: Not on file   ? Highest education level: Not on file   Occupational History   ? Occupation: book keeping     Employer: RETIRED     Comment: " retired at 64   Social Needs   ? Financial resource strain: Not on file   ? Food insecurity:     Worry: Not on file     Inability: Not on file   ? Transportation needs:     Medical: Not on file     Non-medical: Not on file   Tobacco Use   ? Smoking status: Never Smoker   ? Smokeless tobacco: Never Used   Substance and Sexual Activity   ? Alcohol use: No   ? Drug use: No   ? Sexual activity: No   Lifestyle   ? Physical activity:     Days per week: Not on file     Minutes per session: Not on file   ? Stress: Not on file   Relationships   ? Social connections:     Talks on phone: Not on file     Gets together: Not on file     Attends Buddhism service: Not on file     Active member of club or organization: Not on file     Attends meetings of clubs or organizations: Not on file     Relationship status: Not on file   ? Intimate partner violence:     Fear of current or ex partner: Not on file     Emotionally abused: Not on file     Physically abused: Not on file     Forced sexual activity: Not on file   Other Topics Concern   ? Not on file   Social History Narrative    Lives with her youngest sister, Edith, in a home they jointly own.          Medications  Allergies   Current Outpatient Medications   Medication Sig Dispense Refill   ? alendronate (FOSAMAX) 70 MG tablet Take 1 tablet by mouth once a week.     ? atorvastatin (LIPITOR) 10 MG tablet Take 10 mg by mouth bedtime.     ? furosemide (LASIX) 20 MG tablet Take 2 tablets (40 mg total) by mouth daily. 60 tablet 11   ? levothyroxine (SYNTHROID, LEVOTHROID) 112 MCG tablet Take 1 tablet by mouth daily.     ? metoprolol succinate (TOPROL-XL) 25 MG TAKE 2 TABLETS EVERY  tablet 1   ? warfarin (COUMADIN) 5 MG tablet Take 1 tabet by mouth 3 days per week and 1.5 tablets 4 days per week. Adjust dose based on INR results as directed.     ? amoxicillin (AMOXIL) 500 MG capsule Prior to the Dentist       No current facility-administered medications for this visit.        Allergies   Allergen Reactions   ? Aspirin          Lab Results    Chemistry CBC BNP   Lab Results   Component Value Date    CREATININE 0.82 08/07/2015    BUN 16 08/07/2015     08/07/2015    K 4.1 08/07/2015     08/07/2015    CO2 26 08/07/2015     Creatinine (mg/dL)   Date Value   08/07/2015 0.82   07/16/2015 0.79   06/18/2015 0.72   05/28/2015 0.78    Lab Results   Component Value Date    WBC 3.4 (L) 08/07/2015    HGB 11.8 (L) 07/28/2017    HCT 34.5 (L) 08/07/2015    MCV 86 08/07/2015     08/07/2015    Lab Results   Component Value Date    BNP 68 07/16/2018     BNP (pg/mL)   Date Value   07/16/2018 68   07/14/2017 60   05/09/2016 35            Marleny Phoenix CNP  [facility]   Heart Failure Clinic

## 2021-06-28 NOTE — PROGRESS NOTES
"Progress Notes by Jax Velásquez MD at 11/6/2019 11:10 AM     Author: Jax Velásquez MD Service: -- Author Type: Physician    Filed: 11/6/2019 11:55 AM Encounter Date: 11/6/2019 Status: Signed    : Jax Velásquez MD (Physician)             Assessment:    1. Permanent atrial fibrillation  Echo Complete    Holter Monitor    BNP(B-type Natriuretic Peptide)    Basic Metabolic Panel   2. Dyslipidemia, goal LDL below 100  atorvastatin (LIPITOR) 10 MG tablet   3. Edema, unspecified type  furosemide (LASIX) 20 MG tablet    BNP(B-type Natriuretic Peptide)    Basic Metabolic Panel       Plan:    1. Continue current cardiac medications; she will use furosemide prn only.  2. Return to see Dr. Velásquez in one year after the testing noted above.    An After Visit Summary was printed and given to the patient.    Please note that Bill is not working and this note was typed by hand and is brief as a result.    Subjective:    Chiquita Flores returned for a planned six month follow up visit.    She was able to stop furosemide by being more careful with her diet. She weighs herself every day.     She does not report orthopnea, edema, chest pain, palpitations or syncope.    Past Medical History:    Patient Active Problem List   Diagnosis   ? Permanent Atrial Fibrillation, valvular, as a result of rheumatic heart disease   ? Adult hypothyroidism   ? Dyslipidemia, goal LDL below 100       Past Medical History:   Diagnosis Date   ? Adult hypothyroidism 5/8/2009   ? Ascites 2015    due to hepatic congestion from severe TR   ? Bilateral pleural effusion 10/19/2015    Left was drained at [facility] per Dr. Celeste Gar; right not drained as right lung was \"trapped\" per pulmonary consultant there.   ? Cataract 11/3/2011   ? Cerebral infarct (H) 5/13/2015   ? Closed femur fracture (H) 2015   ? Dyslipidemia, goal LDL below 100    ? Essential hypertension    ? History of right heart failure 2015    due to severe TR as a result of " "rheumatic valve disease   ? History of transfusion    ? Nonocclusive coronary atherosclerosis of native coronary artery     minimal disease per angio report of Dr. Elias   ? Permanent Atrial Fibrillation, valvular, as a result of rheumatic heart disease    ? Rheumatic valvular disease    ? Secondary Pulmonary Hypertension     Mean pulmonary artery pressure was 28 mmHg at right heart catheterization performed in  by Dr. Vidal Elias; she has a history of rheumatic valvular heart disease with aortic and mitral valve mechanical prostheses implanted in  at [facility].    ? Tricuspid Regurgitation, now SP tricuspid valve replacement    ? Type 2 Diabetes Mellitus        Past Surgical History:   Procedure Laterality Date   ? MECHANICAL AORTIC AND MITRAL VALVE REPLACEMENT  1985    #21 [address]. Gurwinder aortic and #25 [address]. Gurwinder mitral done by a [facility] surgeon at [facility]   ? AR PARTIAL HIP REPLACEMENT      ball prosthesis to treat hip fx   ? TRICUSPID VALVE REPLACEMENT  2015    29 mm St. Gurwinder Epic tissue valve by Dr. Neo Teague at [facility]        reports that she has never smoked. She has never used smokeless tobacco. She reports that she does not drink alcohol or use drugs.    Family History   Problem Relation Age of Onset   ? Sudden death Father 55        no autopsy   ? Alzheimer's disease Sister    ? Other Sister          from burn injury while playing with matches   ? Other Mother         \"old age\", fell two months before her death and  in a nursing home   ? Osteoporosis Sister         living in [facility]   ? Arthritis Sister         lives in an apartment   ? No Medical Problems Sister    ? No Medical Problems Sister         she lives with [facility]       Outpatient Encounter Medications as of 2019   Medication Sig Dispense Refill   ? alendronate (FOSAMAX) 70 MG tablet Take 1 tablet by mouth once a week.     ? amoxicillin (AMOXIL) 500 MG capsule " "Prior to the Dentist     ? atorvastatin (LIPITOR) 10 MG tablet Take 1 tablet (10 mg total) by mouth daily. 90 tablet 3   ? levothyroxine (SYNTHROID, LEVOTHROID) 112 MCG tablet Take 1 tablet by mouth daily.     ? metoprolol succinate (TOPROL-XL) 25 MG TAKE 2 TABLETS EVERY  tablet 1   ? warfarin (COUMADIN) 5 MG tablet Take 1 tabet by mouth 3 days per week and 1.5 tablets 4 days per week. Adjust dose based on INR results as directed.     ? [DISCONTINUED] atorvastatin (LIPITOR) 10 MG tablet Take 10 mg by mouth bedtime.     ? furosemide (LASIX) 20 MG tablet Take 1 tablet (20 mg total) by mouth daily as needed (for leg swelling or fluid weight gain). 30 tablet 5   ? [DISCONTINUED] furosemide (LASIX) 20 MG tablet Take 1 tablet (20 mg total) by mouth every other day. 60 tablet 11     No facility-administered encounter medications on file as of 11/6/2019.        Review of Systems:     General: WNL  Eyes: WNL  Ears/Nose/Throat: WNL  Lungs: WNL  Heart: WNL  Stomach: WNL  Bladder: Frequent Urination at Night  Muscle/Joints: WNL  Skin: WNL  Nervous System: WNL  Mental Health: WNL     Blood: WNL    Objective:     /60 (Patient Site: Left Arm, Patient Position: Sitting, Cuff Size: Adult Small)   Pulse 60   Resp 16   Ht 5' 3\" (1.6 m)   Wt 117 lb (53.1 kg)   BMI 20.73 kg/m    Wt Readings from Last 5 Encounters:   11/06/19 117 lb (53.1 kg)   05/21/19 112 lb 9.6 oz (51.1 kg)   04/05/19 109 lb 5 oz (49.6 kg)   04/02/19 113 lb (51.3 kg)   03/21/19 118 lb (53.5 kg)        Physical Exam:    GENERAL APPEARANCE: alert, no apparent distress  EYES: no scleral icterus  NECK: no carotid bruits or adenopathy, jugular venous pressure is less than 5 cm  CHEST: symmetric, the lungs are clear to auscultation  CARDIOVASCULAR: irregular rhythm with clear prosthetic tones and no murmurs  EXTREMITIES: no cyanosis, clubbing or edema  SKIN: no xanthelasma  NEUROLOGIC: normal gait and coordination  PSYCHIATRIC: mood and affect are " normal    Cardiac Testing:    Echocardiogram:   Results for orders placed during the hospital encounter of 04/05/19   Echo CAILIN [ECH01] 04/05/2019    Addendum   Transesophageal in combination with limited transthoracic echocardiogram  was performed.   Normal left ventricular size. Moderate left ventricular hypertrophy   Left ventricle ejection fraction is normal. Left ventricular ejection  fraction estimated 60-65%   Normal right ventricular size and systolic function.   Tilting-disc mechanical mitral valve prosthesis. Valve leaflets appear  to open normally. Mean gradient of 4 mmHg which which is within normal  limits.   Mobile echodensities associated with the mitral valve most likely  consistent with retained chordae.   Tilting-disc mechanical valve in aortic position. Leaflets not fully  visualized.. Elevated peak velocity. peak gradient and mean gradient . The  peak velocity is 3.4 m/s with a mean gradient of 28 mmHg. Reduced DVI.   Bioprosthetic tricuspid valve. Leaflets appear thin with normal opening  excursion. Mean gradient 4 mmHg   Biatrial enlargement   Left atrial appendage with dense spontaneous contrast. Possible small  thrombus.   When compared to prior studies. Mean gradient on multiple studies dating  back to 2015 range from 26 mmHg to 37 mmHg. Potential findings represent  small size of the prosthetic aortic valve, although some degree of  prosthetic aortic valve dysfunction is not excluded   Please see separate fluoroscopy report from the same day        Andre Bagley MD 4/6/2019  4:30 PM          Narrative   Transesophageal in combination with limited transthoracic echocardiogram   was performed.    Normal left ventricular size. Moderate left ventricular hypertrophy    Left ventricle ejection fraction is normal. Left ventricular ejection   fraction estimated 60-65%    Normal right ventricular size and systolic function.    Tilting-disc mechanical mitral valve prosthesis. Valve leaflets appear    to open normally. Mean gradient of 4 mmHg which which is within normal   limits.    Mobile echodensities associated with the mitral valve most likely   consistent with retained chordae.    Tilting-disc mechanical valve in aortic position. Leaflets not fully   visualized.. Elevated peak velocity. peak gradient and mean gradient . The   peak velocity is 3.4 m/s with a mean gradient of 28 mmHg. Reduced DVI.    Bioprosthetic tricuspid valve. Leaflets appear thin with normal opening   excursion. Mean gradient 4 mmHg    Biatrial enlargement    When compared to prior studies. Mean gradient on multiple studies dating   back to 2015 range from 26 mmHg to 37 mmHg. Potential findings represent   small size of the prosthetic aortic valve, although some degree of   prosthetic aortic valve dysfunction is not excluded    Please see separate fluoroscopy report from the same day          Cardiac Catheterization:   Results for orders placed during the hospital encounter of 04/05/19   Cardiac Catheterization [CATH01] 04/05/2019    Narrative Mechanical AV - St. Gurwinder - difficult to visualize, but both valves appear   to move approriately    Mechanical MV - St. Gurwinder - both valves open appropriately - noted   intermittently one valve leaflet opens before or after the other valve -   can be either valve.   No sign either leaflet is obstructed or does not   open or close completely.            Results for orders placed in visit on 03/20/19   Echo Stress Exercise [ECH07]        Imaging:    No results found.    Lab Results:    Lab Results   Component Value Date    CREATININE 0.73 05/14/2019    BUN 17 05/14/2019     05/14/2019    K 4.7 05/14/2019     05/14/2019    CO2 28 05/14/2019     Lab Results   Component Value Date    CHOL 95 08/07/2015    TRIG 69 08/07/2015    HDL 24 (L) 08/07/2015     BNP (pg/mL)   Date Value   05/14/2019 55   07/16/2018 68   07/14/2017 60     Creatinine (mg/dL)   Date Value   05/14/2019 0.73   04/02/2019  0.72   03/21/2019 0.68   08/07/2015 0.82     Magnesium (mg/dL)   Date Value   05/14/2019 2.2   05/28/2015 2.2     LDL Calculated (mg/dL)   Date Value   08/07/2015 57     Lab Results   Component Value Date    WBC 3.4 (L) 08/07/2015    WBC 4.5 05/19/2015    HGB 11.6 (L) 05/14/2019    HCT 34.5 (L) 08/07/2015    MCV 86 08/07/2015     08/07/2015           Much or all of the text in this note was generated through the use of the Dragon Dictate voice-to-text software. Errors in spelling or words which seem out of context are unintentional. Sound alike errors, in particular, may have escaped editing.

## 2021-06-28 NOTE — PROGRESS NOTES
Progress Notes by Kentrell Ramon at 1/20/2020 12:30 PM     Author: Kentrell Ramon Service: -- Author Type: Patient Access    Filed: 1/21/2020  1:03 PM Encounter Date: 1/20/2020 Status: Addendum    : Kentrell Ramon (Patient Access)    Related Notes: Original Note by Kentrell Ramon (Patient Access) filed at 1/20/2020  1:41 PM         Zestar Study Consent Visit    Study description: ECG and PPG Study: Zestar Study      Note time seated: 1248     Chiquita Flores a 74 y.o. female , was seen in 2500 today to discuss participation in the Zestar study.   The consent discussion began on 1/16/20.  Please refer to phone call note from Fernando Sparks for more details.  The consent form was reviewed with the patient.     The review of the study included:    Study purpose     Conflict of interest    Device description      Study visits    Risks of participation    Benefits (if any)    Alternatives    Voluntary participation    Confidentiality     Compensation/costs of participation    Study stipends    Injury and legal rights    The subject was provided time to review the consent form and consider participation. her questions were answered to her satisfaction.   The patient has voluntarily agreed to participate in the above noted study.     The consent form version 25 Nov 2019 and HIPAA form version 11 Jun 2019 was signed 01/20/20 at 1300    The subject was provided with a copy of the consent form and HIPAA. A copy of the signed forms was forwarded to medical records.    No study procedures were done prior to Chiquita Flores providing informed consent.     Kentrell Ramon    Subject Restrictions During Study -Confirmed with Subject prior to any study procedures completed    Restrictions on jewelry, recreational drugs, caffeine, and exercise few days prior and during study.   1. Subjects should not consume excessive amount of caffeine (6 or more 8-oz cups of coffee, or more than 570 mg of caffeine from energy drinks, pills or  "similar substance) during their participation in the study.   2. Subjects should not consume excessive amount of alcohol for the duration of their participation in the study. A typical moderate amount is allowed during stage 3.   3. Subjects should not take any recreational drugs (including, but not limited to methamphetamines, cocaine, opioids, cannabis, LSD) for the duration of their participation in the study.   4. Subjects should not wear underwire bra or jewelry during the in-lab study (to not interfere with electrode placement and ECG data recordings).   5. Subject will not be permitted to have their cell phone or any electronic recording device on or with them during the in-lab test session(s).   6. Subjects under 22 years old will not be permitted to take ECG recordings through the ECG regan on the wrist-worn devices.     For study stage 3 only   1. Subjects should only do high intensity exercise (e.g. sprinting, heavy lifting, etc.) in the morning upon awakening or else not at all   2. Subjects should abstain from swimming during the time of the study   3. Subjects should only shower in the morning upon awakening (or else not at all)   4. Female subjects are strongly suggested to wear non-underwire bras throughout this stage of the study     Study Data collections   Vitals  (TPBP)     Vitals:    01/20/20 1304 01/20/20 1305 01/20/20 1306   BP: 126/71 125/70 123/66   Patient Site: Left Arm Left Arm Left Arm   Patient Position: Sitting Sitting Sitting   Cuff Size: Adult Regular Adult Regular Adult Regular   Pulse: 77 79 79   Resp: 16     Temp: 97.8  F (36.6  C)     TempSrc: Oral     Weight:   116 lb (52.6 kg)   Height:   5' 3\" (1.6 m)      VS taken after 5 min rest     MAP 1    87  MAP 2      92   MAP 3             84          Body mass index is 20.55 kg/m .  female  1945  74 y.o.    Time subject placed supine: 1310    Ethnicity   []   or    [x]  Not  or   Race   []  American " " or    []    []  Black or   []   or Other   [x]  White  Physical Activity Level  per subject report:   []  0- Extremely Inactive []  1- Sedentary [x]  2- Moderately Active  []  3- Vigorously Active []  4- Extremely Active  Trained Athlete   [] Yes  [] No     Nielsen's' Skin type   [] Type 1 [x] Type 2 [] Type 3    [] Type 4 [] Type 5 [] Type 6    Subject participated in previous ECG study at [facility]: [] Yes    [x] No    Past Medical History:   Diagnosis Date   ? Adult hypothyroidism 5/8/2009   ? Ascites 2015    due to hepatic congestion from severe TR   ? Bilateral pleural effusion 10/19/2015    Left was drained at [facility] per Dr. Celeste Gar; right not drained as right lung was \"trapped\" per pulmonary consultant there.   ? Cataract 11/3/2011   ? Cerebral infarct (H) 5/13/2015   ? Closed femur fracture (H) 2015   ? Dyslipidemia, goal LDL below 100 2017   ? History of right heart failure 2015    due to severe TR as a result of rheumatic valve disease   ? History of transfusion    ? Nonocclusive coronary atherosclerosis of native coronary artery 2015    minimal disease per angio report of Dr. Elias   ? Permanent Atrial Fibrillation, valvular, as a result of rheumatic heart disease 1985   ? Rheumatic valvular disease 1985   ? Secondary Pulmonary Hypertension     Mean pulmonary artery pressure was 28 mmHg at right heart catheterization performed in 2015 by Dr. Vidal Elias; she has a history of rheumatic valvular heart disease with aortic and mitral valve mechanical prostheses implanted in 1985 at hospitals.    ? Tricuspid Regurgitation, now SP tricuspid valve replacement 2011   ? Type 2 Diabetes Mellitus 2015    controlled with diet       HISTORY OF HEART RHYTHM ABNORMALITIES (check only group subject is 'randomized[' to-only 1)  []  Group 1: History of paroxysmal atrial fibrillation (no excluded medications)  []  Group 2: History of " "paroxysmal atrial fibrillation (on excluded medications)    []  Group 3: History of high-rate atrial fibrillation   []  Group 4: History of atrial fibrillation with rate control medications    [x]  Group 5: Permanent/persistent atrial fibrillation    []  Group 6: history of atrial flutter  []  Group 7: Frequent PVCs  []  Group 8: Frequent PACs  []  Group 9: BBB (left or right)  []  Group 10: History of 2nd Heart Block (any type)  []  Group 11: History of bigeminy, trigeminy, and/or quadgeminy  []  Group 12: History of tachycardia     Special interest allergies: active allergic skin reactions  Allergies   Allergen Reactions   ? Aspirin        Current Outpatient Medications:   ?  alendronate (FOSAMAX) 70 MG tablet, Take 1 tablet by mouth once a week., Disp: , Rfl:   ?  amoxicillin (AMOXIL) 500 MG capsule, Prior to the Dentist, Disp: , Rfl:   ?  atorvastatin (LIPITOR) 10 MG tablet, Take 1 tablet (10 mg total) by mouth daily., Disp: 90 tablet, Rfl: 3  ?  furosemide (LASIX) 20 MG tablet, Take 1 tablet (20 mg total) by mouth daily as needed (for leg swelling or fluid weight gain)., Disp: 30 tablet, Rfl: 5  ?  levothyroxine (SYNTHROID, LEVOTHROID) 112 MCG tablet, Take 1 tablet by mouth daily., Disp: , Rfl:   ?  metoprolol succinate (TOPROL-XL) 25 MG, TAKE 2 TABLETS EVERY DAY, Disp: 180 tablet, Rfl: 1  ?  warfarin (COUMADIN) 5 MG tablet, Take 1 tabet by mouth 3 days per week and 1.5 tablets 4 days per week. Adjust dose based on INR results as directed., Disp: , Rfl:       10-sec 12-lead ECG & 30-sec 12-lead ECG rhythm strip done; reviewed by & PE done by Michael Jonas  Subject Questionnaire    OCCUPATION: Retired   Predominately works outdoors  [] Yes    [x] No      Hours/week spent outdoors (total, not only for work): 1    Frequently participates in \"hand intensive\" activities [] Yes [x] No  Caffeine  []  Never  [] Occasionally        [x]  Daily (1 cup/day)     []  Daily (>1 cup/day)    Alcohol   [x]  Never  [] Light (drink " "or 2 occasionally) []  Moderate (a drink or 2 almost daily)   []  Occasional-heavy (more than a few drinks <2x / month)  [] Heavy (more than a few drinks >2x / month)    Tobacco/nicotine  [x]  Never  [] Rarely  []  Frequently/ Daily     Mattress Information    Mattress type:  [x]  Memory foam [] Gel  [] Innerspring (coil)  [] Airbed  [] Waterbed [] Sh[place]ibuton  [] Hybrid  [] No mattress  [] Other (comment):    Mattress foundation   [] Mattress on floor/ground    [] Mattress on foundation/box spring on floor/ground  [x] Mattress on foundation/box spring on bed frame  [] Mattress on tatami on floor/ground  [] Other (comment):    Mattress topper   [x] No mattress topper  [] Pillow top  [] Foam top - flat style  [] Foam top - \"egg crate\" style  [] Other (comment):    Co-sleeper    []  Yes  [x]  No    CPAP use   [] Yes  [x] No      Dominant hand [x] left  [] right [] ambidextrous  Preferred Wrist to wear band on   [x]  left   []  right   Were screening day & study day: [x]  same [] different   Same: wrist circumference:      125   mm    Crown orientation:  []  left   [x]  right  Device wearing wrist hairiness:     [x]  Light []  Medium       []  Heavy  Spectophotometer    Pregnancy test    [] WOCBP (age <55 yrs, no tubal ligation, no hysterectomy)    [x] n/a male or female not child bearing potential  For Stage 2: subject will use exercise bike for exercise portion of the study  Room Temperature: 20 C   Laptop ID: [id number]  CS Cam ID:[id number]    Subject did not participant in the study due to not meeting wrist circumference criteria.  Kentrell Ramon       "

## 2021-06-29 NOTE — PROGRESS NOTES
Progress Notes by Thai Beckwith MD (Ted) at 9/2/2020 10:50 AM     Author: Thai Beckwith MD (Ted) Service: -- Author Type: Physician    Filed: 9/2/2020 12:15 PM Encounter Date: 9/2/2020 Status: Signed    : Thai Beckwith MD (Ted) (Physician)             Thank you, Dr. Tarango, for asking [facility] to evaluate Ms. Chiquita Flores.      Assessment/Recommendations   Assessment:    Acute on chronic heart failure with preserved ejection fraction, persistent fluid overload/recurrent pleural effusion  History of rheumatic fever with mechanical aortic and mitral valves, normal auscultation today  History of severe tricuspid regurgitation status post replacement with bioprosthesis in 2015  Permanent atrial fibrillation, rate controlled  Plan:  Increase furosemide to 40 mg twice a day for persistent fluid overload.  Follow-up with CHF nurse practitioner in 1 week.  Echo to reassess the valves/RV function/pericardium    I suspect her symptoms are primarily related to right heart failure as a sequelae of rheumatic fever/ left-sided valve involvement.  One may consider pericardial constriction after 2 separate open heart surgeries.    She will pose significant diagnostic challenge considering multiple valve replacement-it will not be easy to perform another right heart catheterization.  Cardiac MR will be out of questions considering artificial metallic valves.             History of Present Illness    Ms. Chiquita Florse is a 75 y.o. female who comes into rapid access clinic because of persistent shortness of breath.  She states that her symptoms developed gradually over the course of last several months.  Currently even minimal physical activity brings dyspnea.  She complains orthopnea.  She stated her weight has not changed significantly.  She was hospitalized at [facility] with left pleural effusion.  Thoracentesis revealed transudate fluid.  Dose of  furosemide was increased to 20 mg twice a day.  She has not noticed any significant weight loss.  She denies chest pains.  She states that INR has never been below therapeutic levels.  She has been compliant with all cardiac medications.    Coronary Angiogram: 2015  Normal coronaries     Physical Examination Review of Systems   Vitals:    09/02/20 1105   BP: 118/62   Pulse: 78   Resp: 14   SpO2: 96%     Body mass index is 20.55 kg/m .  Wt Readings from Last 3 Encounters:   09/02/20 116 lb (52.6 kg)   01/20/20 116 lb (52.6 kg)   11/06/19 117 lb (53.1 kg)     General Appearance:   Alert, cooperative, no distress, appears stated age   Head/ENT: Normocephalic, without obvious abnormality. Membranes moist      EYES:  no scleral icterus, normal conjunctivae   Neck: Supple, symmetrical, trachea midline, no adenopathy, thyroid: not enlarged, symmetric, no carotid bruit.  Elevated JVD up to the angle of mandibula   Chest/Lungs:    Decreased breath sounds left base   Cardiovascular:   irregular rhythm, crisp clicks of artificial aortic and mitral valve, 2/6 systolic murmur at the left lower sternal border   Abdomen:  Soft, non-tender, bowel sounds active all four quadrants,  no masses, no organomegaly   Extremities: no cyanosis or clubbing.  1+ edema   Skin: Skin color, texture, turgor normal, no rashes or lesions.    Psychiatric: Normal affect, calm   Neurologic: Alert and oriented x 3, moving all four extremities.     General: WNL  Eyes: WNL  Ears/Nose/Throat: WNL  Lungs: Cough, Shortness of Breath  Heart: Shortness of Breath with activity  Stomach: WNL  Bladder: WNL  Muscle/Joints: WNL  Skin: WNL  Nervous System: WNL  Mental Health: WNL     Blood: WNL     Medical History  Surgical History Family History Social History   Past Medical History:   Diagnosis Date   ? Adult hypothyroidism 5/8/2009   ? Ascites 2015    due to hepatic congestion from severe TR   ? Bilateral pleural effusion 10/19/2015    Left was drained at [facility] per  "Dr. Celeste Gar; right not drained as right lung was \"trapped\" per pulmonary consultant there.   ? Cataract 11/3/2011   ? Cerebral infarct (H) 5/13/2015   ? Closed femur fracture (H) 2015   ? Dyslipidemia, goal LDL below 100 2017   ? History of right heart failure 2015    due to severe TR as a result of rheumatic valve disease   ? History of transfusion    ? Nonocclusive coronary atherosclerosis of native coronary artery 2015    minimal disease per angio report of Dr. Elias   ? Permanent Atrial Fibrillation, valvular, as a result of rheumatic heart disease 1985   ? Rheumatic valvular disease 1985   ? Secondary Pulmonary Hypertension     Mean pulmonary artery pressure was 28 mmHg at right heart catheterization performed in 2015 by Dr. Vidal Elias; she has a history of rheumatic valvular heart disease with aortic and mitral valve mechanical prostheses implanted in 1985 at [facility].    ? Tricuspid Regurgitation, now SP tricuspid valve replacement 2011   ? Type 2 Diabetes Mellitus 2015    controlled with diet    Past Surgical History:   Procedure Laterality Date   ? MECHANICAL AORTIC AND MITRAL VALVE REPLACEMENT  02/08/1985    #21 [address] aortic and #25 [address] mitral done by a [facility] surgeon at [facility]   ? MD PARTIAL HIP REPLACEMENT  2013    ball prosthesis to treat hip fx   ? TRICUSPID VALVE REPLACEMENT  9/1/2015    29 mm [address] [employer] Epic tissue valve by Dr. Neo Teague at [facility]    no family history of premature coronary artery disease Social History     Socioeconomic History   ? Marital status: Single     Spouse name: Not on file   ? Number of children: 0   ? Years of education: Not on file   ? Highest education level: Not on file   Occupational History   ? Occupation: book keeping     Employer: RETIRED     Comment: retired at 64   Social Needs   ? Financial resource strain: Not on file   ? Food insecurity     Worry: Not on file     Inability: Not on file   ? Transportation needs     Medical: Not " on file     Non-medical: Not on file   Tobacco Use   ? Smoking status: Never Smoker   ? Smokeless tobacco: Never Used   Substance and Sexual Activity   ? Alcohol use: No   ? Drug use: No   ? Sexual activity: Never   Lifestyle   ? Physical activity     Days per week: Not on file     Minutes per session: Not on file   ? Stress: Not on file   Relationships   ? Social connections     Talks on phone: Not on file     Gets together: Not on file     Attends Judaism service: Not on file     Active member of club or organization: Not on file     Attends meetings of clubs or organizations: Not on file     Relationship status: Not on file   ? Intimate partner violence     Fear of current or ex partner: Not on file     Emotionally abused: Not on file     Physically abused: Not on file     Forced sexual activity: Not on file   Other Topics Concern   ? Not on file   Social History Narrative    Lives with her youngest sister, Edith, in a home they jointly own.          Medications  Allergies   Current Outpatient Medications   Medication Sig Dispense Refill   ? alendronate (FOSAMAX) 70 MG tablet Take 1 tablet by mouth once a week.     ? amoxicillin (AMOXIL) 500 MG capsule Prior to the Dentist     ? aspirin 81 mg chewable tablet Chew 81 mg daily.     ? atorvastatin (LIPITOR) 10 MG tablet Take 1 tablet (10 mg total) by mouth daily. 90 tablet 3   ? levothyroxine (SYNTHROID, LEVOTHROID) 112 MCG tablet Take 1 tablet by mouth daily.     ? metoprolol succinate (TOPROL-XL) 25 MG TAKE 2 TABLETS EVERY  tablet 1   ? warfarin (COUMADIN) 5 MG tablet Take 1 tabet by mouth 3 days per week and 1.5 tablets 4 days per week. Adjust dose based on INR results as directed.     ? furosemide (LASIX) 40 MG tablet Take 1 tablet (40 mg total) by mouth 2 (two) times a day. 60 tablet 12     No current facility-administered medications for this visit.       No Known Allergies      Lab Results    Chemistry/lipid CBC Cardiac Enzymes/BNP/TSH/INR   Lab  Results   Component Value Date    CHOL 95 08/07/2015    HDL 24 (L) 08/07/2015    LDLCALC 57 08/07/2015    TRIG 69 08/07/2015    CREATININE 0.73 05/14/2019    BUN 17 05/14/2019    K 4.7 05/14/2019     05/14/2019     05/14/2019    CO2 28 05/14/2019    Lab Results   Component Value Date    WBC 3.4 (L) 08/07/2015    HGB 11.6 (L) 05/14/2019    HCT 34.5 (L) 08/07/2015    MCV 86 08/07/2015     08/07/2015    Lab Results   Component Value Date    BNP 55 05/14/2019    TSH 0.41 05/14/2019    INR 2.47 (H) 04/05/2019

## 2021-06-29 NOTE — PROGRESS NOTES
Progress Notes by Marleny Phoenix CNP at 9/11/2020  9:50 AM     Author: Marleny Phoenix CNP Service: -- Author Type: Nurse Practitioner    Filed: 9/11/2020 10:28 AM Encounter Date: 9/11/2020 Status: Signed    : Marleny Phoenix CNP (Nurse Practitioner)             Assessment/Recommendations   Assessment:    1.  Heart failure with preserved ejection fraction: She continues to have shortness of breath with minimal activity and orthopnea but denies any worsening since last week.  She had no improvement with increasing Lasix.  She has mild ankle edema.    2.  Permanent atrial fibrillation: Continue warfarin.  Heart rate control.    3.  Valvular disease, rheumatic heart disease: History of aortic and mitral valve replacement in 1985 and tricuspid valve replacement in 2015    Plan:  1.  Continue current medications.  I may adjust diuretic depending on lab results.  2.  Dr. Velásquez has ordered a cardiac CT to look for pericardial thickening and if positive have a right heart cath  3.  BNP, renal profile, hepatic profile, and hemoglobin pending  4.  We discussed going to the emergency room if symptoms worsen    Chiquita Flores will follow up with Dr. Velásquez in November.       History of Present Illness/Subjective    Ms. Chiquita Flores is a 75 y.o. female seen at [facility] today for continued follow-up.  She has a history of heart failure with preserved ejection fraction, permanent atrial fibrillation, rheumatic fever, tricuspid valve replacement in 2015, and pleural effusion (recently hospitalized at [facility]).    She was seen by Dr. Beckwith on September 2 due to continued shortness of breath.  Her Lasix was increased to 40 mg twice daily.  She has had no improvement in her symptoms.  She continues to have shortness of breath with minimal activity and orthopnea.  Her weight has been stable at 114 pounds.  She has mild lower extremity edema. She denies  lightheadedness and chest pain.      She is following a low-sodium diet.      ECHO:   Results for orders placed during the hospital encounter of 09/03/20   Echo Complete [ECH10] 09/03/2020    Narrative   Left ventricle ejection fraction is normal. The calculated left   ventricular ejection fraction is 57%.    Right ventricle not well visualized. The systolic function is moderately   reduced.    Aortic Valve: There is a bileaflet tilting disc mechanical valve   present. There is no aortic insufficiency present. Moderate systolic   gradient noted.    Mitral Valve: There is a bileaflet mechanical mitral valve prosthesis.   Moderate diastolic gradient noted -possible stenosis    Tricuspid Valve: There is a bioprosthetic valve present. It appears to   be functioning normally.    When compared to the previous study dated 4/5/2019, aortic prosthetic   gradient has decreased significantly. The mitral diastolic gradient is   mildly increased           Physical Examination Review of Systems   Vitals:    09/11/20 0946   BP: 122/82   Pulse: 100   Resp: 16     Body mass index is 21.29 kg/m .  Wt Readings from Last 3 Encounters:   09/11/20 120 lb 3.2 oz (54.5 kg)   09/03/20 116 lb (52.6 kg)   09/02/20 116 lb (52.6 kg)       General Appearance:     Alert, cooperative and in no acute distress.   ENT/Mouth: membranes moist, no oral lesions or bleeding gums.      EYES:  no scleral icterus, normal conjunctivae   Chest/Lungs:   lungs are clear to auscultation, no rales or wheezing, respirations unlabored   Cardiovascular:   Irregular. Normal first and second heart sounds, 1+ edema bilateral ankles   Abdomen:  Soft, nontender, nondistended, bowel sounds present   Extremities: no cyanosis or clubbing   Skin: warm, dry.    Neurologic: mood and affect are appropriate, alert and oriented x3      General: WNL  Eyes: WNL  Ears/Nose/Throat: WNL  Lungs: Shortness of Breath  Heart: Shortness of Breath with activity, Irregular Heartbeat  Stomach:  "WNL  Bladder: Frequent Urination at Night  Muscle/Joints: WNL  Skin: WNL  Nervous System: WNL  Mental Health: WNL     Blood: WNL     Medical History  Surgical History Family History Social History   Past Medical History:   Diagnosis Date   ? Adult hypothyroidism 5/8/2009   ? Ascites 2015    due to hepatic congestion from severe TR   ? Bilateral pleural effusion 10/19/2015    Left was drained at [facility] per Dr. Celeste Gar; right not drained as right lung was \"trapped\" per pulmonary consultant there.   ? Cataract 11/3/2011   ? Cerebral infarct (H) 5/13/2015   ? Closed femur fracture (H) 2015   ? Dyslipidemia, goal LDL below 100 2017   ? History of right heart failure 2015    due to severe TR as a result of rheumatic valve disease   ? History of transfusion    ? Nonocclusive coronary atherosclerosis of native coronary artery 2015    minimal disease per angio report of Dr. Elias   ? Permanent Atrial Fibrillation, valvular, as a result of rheumatic heart disease 1985   ? Rheumatic valvular disease 1985   ? Secondary Pulmonary Hypertension     Mean pulmonary artery pressure was 28 mmHg at right heart catheterization performed in 2015 by Dr. Vidal Elias; she has a history of rheumatic valvular heart disease with aortic and mitral valve mechanical prostheses implanted in 1985 at [facility].    ? Tricuspid Regurgitation, now SP tricuspid valve replacement 2011   ? Type 2 Diabetes Mellitus 2015    controlled with diet    Past Surgical History:   Procedure Laterality Date   ? MECHANICAL AORTIC AND MITRAL VALVE REPLACEMENT  02/08/1985    #21 [address] aortic and #25 [address]inder mitral done by a [facility] surgeon at [facility]   ? MA PARTIAL HIP REPLACEMENT  2013    ball prosthesis to treat hip fx   ? TRICUSPID VALVE REPLACEMENT  9/1/2015    29 mm St[employer] Gurwinder Epic tissue valve by Dr. Neo Teague at [facility]    Family History   Problem Relation Age of Onset   ? Sudden death Father 55        no autopsy   ? Alzheimer's disease " "Sister    ? Other Sister          from burn injury while playing with matches   ? Other Mother         \"old age\", fell two months before her death and  in a nursing home   ? Osteoporosis Sister         living in [facility]   ? Arthritis Sister         lives in an apartment   ? No Medical Problems Sister    ? No Medical Problems Sister         she lives with Chiquita    Social History     Socioeconomic History   ? Marital status: Single     Spouse name: Not on file   ? Number of children: 0   ? Years of education: Not on file   ? Highest education level: Not on file   Occupational History   ? Occupation: book keeping     Employer: RETIRED     Comment: retired at 64   Social Needs   ? Financial resource strain: Not on file   ? Food insecurity     Worry: Not on file     Inability: Not on file   ? Transportation needs     Medical: Not on file     Non-medical: Not on file   Tobacco Use   ? Smoking status: Never Smoker   ? Smokeless tobacco: Never Used   Substance and Sexual Activity   ? Alcohol use: No   ? Drug use: No   ? Sexual activity: Never   Lifestyle   ? Physical activity     Days per week: Not on file     Minutes per session: Not on file   ? Stress: Not on file   Relationships   ? Social connections     Talks on phone: Not on file     Gets together: Not on file     Attends Caodaism service: Not on file     Active member of club or organization: Not on file     Attends meetings of clubs or organizations: Not on file     Relationship status: Not on file   ? Intimate partner violence     Fear of current or ex partner: Not on file     Emotionally abused: Not on file     Physically abused: Not on file     Forced sexual activity: Not on file   Other Topics Concern   ? Not on file   Social History Narrative    Lives with her youngest sister, Edith, in a home they jointly own.          Medications  Allergies   Current Outpatient Medications   Medication Sig Dispense Refill   ? alendronate " (FOSAMAX) 70 MG tablet Take 1 tablet by mouth once a week.     ? amoxicillin (AMOXIL) 500 MG capsule Prior to the Dentist     ? aspirin 81 mg chewable tablet Chew 81 mg daily.     ? atorvastatin (LIPITOR) 10 MG tablet Take 1 tablet (10 mg total) by mouth daily. 90 tablet 3   ? furosemide (LASIX) 40 MG tablet Take 1 tablet (40 mg total) by mouth 2 (two) times a day. 60 tablet 12   ? levothyroxine (SYNTHROID, LEVOTHROID) 112 MCG tablet Take 1 tablet by mouth daily.     ? metoprolol succinate (TOPROL-XL) 25 MG TAKE 2 TABLETS EVERY  tablet 1   ? potassium chloride (K-DUR,KLOR-CON) 20 MEQ tablet Take 20 mEq by mouth 2 (two) times a day.     ? warfarin (COUMADIN) 5 MG tablet Take 1 tabet by mouth 3 days per week and 1.5 tablets 4 days per week. Adjust dose based on INR results as directed.       No current facility-administered medications for this visit.     No Known Allergies      Lab Results    Chemistry/lipid CBC Cardiac Enzymes/BNP/TSH/INR   Lab Results   Component Value Date    CHOL 95 08/07/2015    HDL 24 (L) 08/07/2015    LDLCALC 57 08/07/2015    TRIG 69 08/07/2015    CREATININE 0.73 05/14/2019    BUN 17 05/14/2019    K 4.7 05/14/2019     05/14/2019     05/14/2019    CO2 28 05/14/2019    Lab Results   Component Value Date    WBC 3.4 (L) 08/07/2015    HGB 11.6 (L) 05/14/2019    HCT 34.5 (L) 08/07/2015    MCV 86 08/07/2015     08/07/2015    Lab Results   Component Value Date    BNP 55 05/14/2019    TSH 0.41 05/14/2019    INR 2.47 (H) 04/05/2019

## 2021-06-29 NOTE — PROGRESS NOTES
Progress Notes by Marleny Phoenix CNP at 9/17/2020  8:30 AM     Author: Marleny Phoenix CNP Service: -- Author Type: Nurse Practitioner    Filed: 9/17/2020  9:06 AM Encounter Date: 9/17/2020 Status: Signed    : Marleny Phoenix CNP (Nurse Practitioner)             Assessment/Recommendations   Assessment:    1.  Heart failure with preserved ejection fraction: She continues to have shortness of breath with minimal activity but no longer has orthopnea.  Lower extremity edema is mildly worse since last week.  Her weight has decreased 2 pounds since last week.    2.  Permanent atrial fibrillation: Continue warfarin.  Heart rate control.    3.  Valvular disease, rheumatic heart disease: History of aortic and mitral valve replacement in 1985 and tricuspid valve replacement in 2015    Plan:  1.  BMP pending and may adjust torsemide dose after labs are reviewed  2.  Dr. Velásquez has ordered a cardiac CT to look for pericardial thickening and if positive have a right heart cath  3.  We discussed going to the emergency room if symptoms worsen    Chiquita Flores will follow up with her primary care provider in 2 weeks and with Dr. Velásquez in November.       History of Present Illness/Subjective    Ms. Chiquita Flores is a 75 y.o. female seen at [facility] today for continued follow-up.  She has a history of heart failure with preserved ejection fraction, permanent atrial fibrillation, rheumatic fever, tricuspid valve replacement in 2015, and pleural effusion (recently hospitalized at [place]).    She was seen in clinic last week with acute heart failure symptoms.  Lasix was discontinued and she was started on torsemide.  She initially had some improvement of her symptoms with torsemide but feels symptoms have worsened over the past 2 days.  She has shortness of breath with minimal activity and lower extremity edema.  She denies orthopnea. She denies lightheadedness and  chest pain.      She is following a low-sodium diet.  She is no longer weighing herself at home.  Clinic weight has decreased 2 pounds since last week.      ECHO:   Results for orders placed during the hospital encounter of 09/03/20   Echo Complete [ECH10] 09/03/2020    Narrative   Left ventricle ejection fraction is normal. The calculated left   ventricular ejection fraction is 57%.    Right ventricle not well visualized. The systolic function is moderately   reduced.    Aortic Valve: There is a bileaflet tilting disc mechanical valve   present. There is no aortic insufficiency present. Moderate systolic   gradient noted.    Mitral Valve: There is a bileaflet mechanical mitral valve prosthesis.   Moderate diastolic gradient noted -possible stenosis    Tricuspid Valve: There is a bioprosthetic valve present. It appears to   be functioning normally.    When compared to the previous study dated 4/5/2019, aortic prosthetic   gradient has decreased significantly. The mitral diastolic gradient is   mildly increased           Physical Examination Review of Systems   Vitals:    09/17/20 0843   BP: 108/58   Pulse: 71   SpO2: 94%     Body mass index is 20.9 kg/m .  Wt Readings from Last 3 Encounters:   09/17/20 118 lb (53.5 kg)   09/11/20 120 lb 3.2 oz (54.5 kg)   09/03/20 116 lb (52.6 kg)       General Appearance:     Alert, cooperative and in no acute distress.   ENT/Mouth: membranes moist, no oral lesions or bleeding gums.      EYES:  no scleral icterus, normal conjunctivae   Chest/Lungs:   lungs are clear to auscultation, no rales or wheezing, respirations unlabored   Cardiovascular:   Irregular. Normal first and second heart sounds, 1+ edema right lower extremity, 1-2+ edema left lower extremity   Abdomen:  Soft, nontender, nondistended, bowel sounds present   Extremities: no cyanosis or clubbing   Skin: warm, dry.    Neurologic: mood and affect are appropriate, alert and oriented x3      General: WNL  Eyes:  "WNL  Ears/Nose/Throat: WNL  Lungs: Cough, Shortness of Breath  Heart: Shortness of Breath with activity, Leg Swelling  Stomach: Diarrhea  Bladder: WNL  Muscle/Joints: WNL  Skin: WNL  Nervous System: WNL  Mental Health: WNL     Blood: WNL     Medical History  Surgical History Family History Social History   Past Medical History:   Diagnosis Date   ? Adult hypothyroidism 5/8/2009   ? Ascites 2015    due to hepatic congestion from severe TR   ? Bilateral pleural effusion 10/19/2015    Left was drained at [facility] per Dr. Celeste Gar; right not drained as right lung was \"trapped\" per pulmonary consultant there.   ? Cataract 11/3/2011   ? Cerebral infarct (H) 5/13/2015   ? Closed femur fracture (H) 2015   ? Dyslipidemia, goal LDL below 100 2017   ? History of right heart failure 2015    due to severe TR as a result of rheumatic valve disease   ? History of transfusion    ? Nonocclusive coronary atherosclerosis of native coronary artery 2015    minimal disease per angio report of Dr. Elias   ? Permanent Atrial Fibrillation, valvular, as a result of rheumatic heart disease 1985   ? Rheumatic valvular disease 1985   ? Secondary Pulmonary Hypertension     Mean pulmonary artery pressure was 28 mmHg at right heart catheterization performed in 2015 by Dr. Vidal Elias; she has a history of rheumatic valvular heart disease with aortic and mitral valve mechanical prostheses implanted in 1985 at hospitals.    ? Tricuspid Regurgitation, now SP tricuspid valve replacement 2011   ? Type 2 Diabetes Mellitus 2015    controlled with diet    Past Surgical History:   Procedure Laterality Date   ? MECHANICAL AORTIC AND MITRAL VALVE REPLACEMENT  02/08/1985    #21 [address] aortic and #25 [address] mitral done by a [facility] surgeon at hospitals   ? OR PARTIAL HIP REPLACEMENT  2013    ball prosthesis to treat hip fx   ? TRICUSPID VALVE REPLACEMENT  9/1/2015    29 mm [address] Gurwinder Epic tissue valve by Dr. Neo Teague at [facility]    Family " "History   Problem Relation Age of Onset   ? Sudden death Father 55        no autopsy   ? Alzheimer's disease Sister    ? Other Sister          from burn injury while playing with matches   ? Other Mother         \"old age\", fell two months before her death and  in a nursing home   ? Osteoporosis Sister         living in [facility]   ? Arthritis Sister         lives in an apartment   ? No Medical Problems Sister    ? No Medical Problems Sister         she lives with Chiquita    Social History     Socioeconomic History   ? Marital status: Single     Spouse name: Not on file   ? Number of children: 0   ? Years of education: Not on file   ? Highest education level: Not on file   Occupational History   ? Occupation: [employer] keeping     Employer: RETIRED     Comment: retired at 64   Social Needs   ? Financial resource strain: Not on file   ? Food insecurity     Worry: Not on file     Inability: Not on file   ? Transportation needs     Medical: Not on file     Non-medical: Not on file   Tobacco Use   ? Smoking status: Never Smoker   ? Smokeless tobacco: Never Used   Substance and Sexual Activity   ? Alcohol use: No   ? Drug use: No   ? Sexual activity: Never   Lifestyle   ? Physical activity     Days per week: Not on file     Minutes per session: Not on file   ? Stress: Not on file   Relationships   ? Social connections     Talks on phone: Not on file     Gets together: Not on file     Attends Pentecostal service: Not on file     Active member of club or organization: Not on file     Attends meetings of clubs or organizations: Not on file     Relationship status: Not on file   ? Intimate partner violence     Fear of current or ex partner: Not on file     Emotionally abused: Not on file     Physically abused: Not on file     Forced sexual activity: Not on file   Other Topics Concern   ? Not on file   Social History Narrative    Lives with her youngest sister, Edith, in a home they jointly own.    "       Medications  Allergies   Current Outpatient Medications   Medication Sig Dispense Refill   ? alendronate (FOSAMAX) 70 MG tablet Take 1 tablet by mouth once a week.     ? amoxicillin (AMOXIL) 500 MG capsule Prior to the Dentist     ? aspirin 81 mg chewable tablet Chew 81 mg daily.     ? atorvastatin (LIPITOR) 10 MG tablet Take 1 tablet (10 mg total) by mouth daily. 90 tablet 3   ? levothyroxine (SYNTHROID, LEVOTHROID) 112 MCG tablet Take 1 tablet by mouth daily.     ? metoprolol succinate (TOPROL-XL) 25 MG TAKE 2 TABLETS EVERY  tablet 1   ? torsemide (DEMADEX) 20 MG tablet torsemide 40 mg (2 tablets) in the morning and 20 mg (1 tablet) in the afternoon. 90 tablet 1   ? warfarin (COUMADIN) 5 MG tablet Take 1 tabet by mouth 3 days per week and 1.5 tablets 4 days per week. Adjust dose based on INR results as directed.       No current facility-administered medications for this visit.     No Known Allergies      Lab Results    Chemistry/lipid CBC Cardiac Enzymes/BNP/TSH/INR   Lab Results   Component Value Date    CHOL 95 08/07/2015    HDL 24 (L) 08/07/2015    LDLCALC 57 08/07/2015    TRIG 69 08/07/2015    CREATININE 0.73 09/11/2020    BUN 23 09/11/2020    K 4.1 09/11/2020     (L) 09/11/2020    CL 92 (L) 09/11/2020    CO2 30 09/11/2020    Lab Results   Component Value Date    WBC 3.4 (L) 08/07/2015    HGB 10.0 (L) 09/11/2020    HCT 34.5 (L) 08/07/2015    MCV 86 08/07/2015     08/07/2015    Lab Results   Component Value Date     (H) 09/11/2020    TSH 0.41 05/14/2019    INR 2.47 (H) 04/05/2019

## 2021-06-30 NOTE — PROGRESS NOTES
"Progress Notes by Jax Velásquez MD at 12/1/2020 10:30 AM     Author: Jax Velásquez MD Service: -- Author Type: Physician    Filed: 12/1/2020 11:22 AM Encounter Date: 12/1/2020 Status: Signed    : Jax Velásquez MD (Physician)             Assessment:    1. Chronic heart failure with preserved ejection fraction (HFpEF) (H)  torsemide (DEMADEX) 20 MG tablet   2. Persistent atrial fibrillation (H)  metoprolol succinate (TOPROL-XL) 25 MG   3. Recurrent pleural effusion on left     4. S/P MVR (mitral valve replacement)     5. S/P AVR (aortic valve replacement)         Plan:    1. Discontinue aspirin and remain on warfarin alone for her permanent atrial fibrillation and to mechanical cardiac valve prostheses.  2. Her medication list was reconciled today.  3. She will proceed with tunneled pleural catheter insertion as recommended by Yong Patel and José Miguel.  4. She will follow up with Dr. Velásquez in 4 months time.    An After Visit Summary was printed and given to the patient.    Subjective:    Chiquita Flores returned for a planned annual follow up visit.    She states she is very breathless with any modest exertion.  When she leaves her apartment she uses a wheelchair.  She has met with both Dr. Patel and Dr. Valdez and is waiting to hear about when she can be scheduled for the tunneled pleural catheter to treat her recurrent left pleural effusion.  She hopes to have this done before the end of the year as she states [facility] will go out of network for her on January 1.  I sent a message to both of those physicians today to update them about her request.    She was hospitalized at [facility] and seen by several of my colleagues.  Echocardiogram suggested stenosis of her mechanical aortic and mitral valve prostheses but a CT angiographic study said both were opening normally.  There was a reference to \"systolic heart failure\" but in point of fact her echocardiograms have consistently " "demonstrated normal left ventricular systolic performance.    She was discharged on a higher dose of metoprolol but went back to her 50 mg dose and subsequent Holter monitoring has proven that this dosage of metoprolol effectively controls her heart rate and atrial fibrillation.  The Holter monitor report is copied below.    She was also discharged on aspirin by the hospitalist even though neither of my colleagues, Yong Palacios or Cecile, recommended it.  Chiquita wonders if she was put on the second blood thinner.  I counseled her that I do not recommend adding aspirin to warfarin and she has done well on warfarin alone for over 20 years with 2 mechanical valve prostheses.  It is my professional opinion that adding aspirin to her warfarin substantially increases her risk of serious bleeding without commensurate benefit and therefore she will stop it.    Past Medical History:    Patient Active Problem List   Diagnosis   ? Permanent Atrial Fibrillation, valvular, as a result of rheumatic heart disease   ? Adult hypothyroidism   ? Dyslipidemia, goal LDL below 100   ? Diet-controlled type 2 diabetes mellitus (H)   ? Osteoporosis   ? Recurrent pleural effusion on left   ? Hyponatremia   ? S/P MVR (mitral valve replacement)   ? S/P AVR (aortic valve replacement)   ? Chronic heart failure with preserved ejection fraction (HFpEF) (H)       Past Medical History:   Diagnosis Date   ? Adult hypothyroidism 05/08/2009   ? Ascites 2015    due to hepatic congestion from severe TR   ? Bilateral pleural effusion 10/19/2015    left was drained at [facility] per Dr. Celeste Gar; right not drained as right lung was \"trapped\" per pulmonary consultant there.   ? Cataract 11/03/2011   ? Cerebral infarct (H) 05/13/2015   ? Closed femur fracture (H) 2015   ? Dyslipidemia, goal LDL below 100 2017   ? History of CVA (cerebrovascular accident) 12/05/2019   ? History of right heart failure 2015    due to severe TR as a result of rheumatic valve disease "   ? History of transfusion    ? Nonocclusive coronary atherosclerosis of native coronary artery 2015    minimal disease per angio report of Dr. Elias   ? Permanent Atrial Fibrillation, valvular, as a result of rheumatic heart disease 1985   ? Recurrent left pleural effusion    ? Rheumatic valvular disease 1985   ? Secondary Pulmonary Hypertension     mean pulmonary artery pressure was 28 mmHg at right heart catheterization performed in 2015 by Dr. Vidal Elias; she has a history of rheumatic valvular heart disease with aortic and mitral valve mechanical prostheses implanted in 1985 at [facility].    ? Tricuspid Regurgitation, now SP tricuspid valve replacement 2011   ? Type 2 Diabetes Mellitus 2015    controlled with diet       Past Surgical History:   Procedure Laterality Date   ? MECHANICAL AORTIC AND MITRAL VALVE REPLACEMENT  02/08/1985    #21 [address] [employer] aortic and #25 [address] mitral done by a [facility] surgeon at [facility]   ? ND PARTIAL HIP REPLACEMENT  2013    ball prosthesis to treat hip fracture   ? TRICUSPID VALVE REPLACEMENT  9/1/2015    29 mm [employer] Gur[employer]inder Epic tissue valve by Dr. Neo Teague at [facility]   ? US THORACENTESIS  10/20/2020        reports that she has never smoked. She has never used smokeless tobacco. She reports that she does not drink alcohol or use drugs.  Social History     Socioeconomic History   ? Marital status: Single     Spouse name: Not on file   ? Number of children: 0   ? Years of education: Not on file   ? Highest education level: Not on file   Occupational History   ? Occupation: book keeping     Employer: RETIRED     Comment: retired at    Social Needs   ? Financial resource strain: Not on file   ? Food insecurity     Worry: Not on file     Inability: Not on file   ? Transportation needs     Medical: Not on file     Non-medical: Not on file   Tobacco Use   ? Smoking status: Never Smoker   ? Smokeless tobacco: Never Used   Substance and Sexual Activity   ? Alcohol use: No  "  ? Drug use: No   ? Sexual activity: Never   Lifestyle   ? Physical activity     Days per week: Not on file     Minutes per session: Not on file   ? Stress: Not on file   Relationships   ? Social connections     Talks on phone: Not on file     Gets together: Not on file     Attends [employer] service: Not on file     Active member of club or organization: Not on file     Attends meetings of clubs or organizations: Not on file     Relationship status: Not on file   ? Intimate partner violence     Fear of current or ex partner: Not on file     Emotionally abused: Not on file     Physically abused: Not on file     Forced sexual activity: Not on file   Other Topics Concern   ? Not on file   Social History Narrative    Lives with her youngest sister, Edith, in a home they jointly own.       Family History   Problem Relation Age of Onset   ? Sudden death Father 55        no autopsy   ? Alzheimer's disease Sister    ? Other Sister          from burn injury while playing with matches   ? Other Mother         \"old age\", fell two months before her death and  in a nursing home   ? Osteoporosis Sister         living in [facility]   ? Arthritis Sister         lives in an apartment   ? No Medical Problems Sister    ? No Medical Problems Sister         she lives with [facility]       Outpatient Encounter Medications as of 2020   Medication Sig Dispense Refill   ? alendronate (FOSAMAX) 70 MG tablet Take 1 tablet by mouth once a week.     ? amoxicillin (AMOXIL) 500 MG capsule Prior to the Dentist     ? atorvastatin (LIPITOR) 10 MG tablet Take 1 tablet (10 mg total) by mouth daily. 90 tablet 3   ? levothyroxine (SYNTHROID, LEVOTHROID) 112 MCG tablet Take 1 tablet by mouth daily.     ? metoprolol succinate (TOPROL-XL) 25 MG Take 2 tablets (50 mg total) by mouth daily. 180 tablet 3   ? spironolactone (ALDACTONE) 25 MG tablet Take 1 tablet (25 mg total) by mouth 2 (two) times a day at 9am and 6pm. 30 " tablet 0   ? torsemide (DEMADEX) 20 MG tablet Take 2 tablets (40 mg total) by mouth 2 (two) times a day at 9am and 6pm. 360 tablet 3   ? warfarin (COUMADIN) 5 MG tablet Take 7.5 mg by mouth Monday, Wednesday, Friday and 5 mg all other days. Adjust dose based on INR results as directed.     ? [DISCONTINUED] aspirin 81 mg chewable tablet Chew 81 mg daily.     ? [DISCONTINUED] metoprolol succinate (TOPROL-XL) 25 MG Take 3 tablets (75 mg total) by mouth daily. (Patient taking differently: Take 50 mg by mouth daily. ) 30 tablet 0   ? [DISCONTINUED] torsemide (DEMADEX) 20 MG tablet Take 3 tablets (60 mg total) by mouth 2 (two) times a day at 9am and 6pm. (Patient taking differently: Take 40 mg by mouth 2 (two) times a day at 9am and 6pm. ) 60 tablet 0   ? [DISCONTINUED] cephalexin (KEFLEX) 500 MG capsule Take 500 mg by mouth.     ? [DISCONTINUED] mirtazapine (REMERON) 7.5 MG tablet Take 1 tablet (7.5 mg total) by mouth at bedtime. 30 tablet 0   ? [DISCONTINUED] polyethylene glycol (MIRALAX) 17 gram packet Take 1 packet (17 g total) by mouth daily. Hold for loose stools 30 packet 0     No facility-administered encounter medications on file as of 12/1/2020.        Review of Systems:     General: WNL  Eyes: WNL  Ears/Nose/Throat: WNL  Lungs: Cough, Shortness of Breath  Heart: Shortness of Breath with activity  Stomach: WNL  Bladder: WNL  Muscle/Joints: WNL  Skin: WNL  Nervous System: WNL  Mental Health: WNL     Blood: WNL    Objective:     /72 (Patient Site: Left Arm, Patient Position: Sitting, Cuff Size: Adult Small)   Pulse (!) 104   Resp 20   SpO2 99%   Breastfeeding No   Wt Readings from Last 5 Encounters:   11/20/20 102 lb (46.3 kg)   11/19/20 102 lb (46.3 kg)   10/31/20 102 lb 14.4 oz (46.7 kg)   10/12/20 109 lb (49.4 kg)   09/18/20 115 lb (52.2 kg)        Physical Exam:    GENERAL APPEARANCE: alert, no apparent distress  EYES: no scleral icterus  NECK: no carotid bruits or adenopathy, jugular venous pressure  is elevated to at least 8 cm  CHEST: symmetric, the lungs are clear to auscultation  CARDIOVASCULAR: irregular rhythm with clear prosthetic valve tones and no murmurs, rubs, or gallops  EXTREMITIES: no cyanosis, clubbing; 2+ pitting leg edema  SKIN: no xanthelasma but she does have chronic venous stasis changes of both legs  NEUROLOGIC: normal coordination; she is using a wheelchair for ambulation  PSYCHIATRIC: mood and affect are normal    Cardiac Testing:    Echocardiogram:   Results for orders placed during the hospital encounter of 10/19/20   Echo Limited W Doppler [ECH22] 10/20/2020    Narrative   Technical Quality: limited visualization    Left ventricle ejection fraction is normal. The estimated left   ventricular ejection fraction is 65%.    Normal right ventricular size.    Aortic Valve: Aortic valve not well visualized. There is a mechanical   valve present. There is no aortic insufficiency present. Moderate systolic   gradient presents-possibly stenotic.    Mitral Valve: There is a bileaflet mechanical mitral valve prosthesis.   Moderate diastolic gradient present-possibly stenotic. There is severe   calcification of the anterior leaflet subvalvular apparatus. There is   severe calcification of the posterior leaflet subvalvular apparatus.    Tricuspid Valve: There is a bioprosthetic valve present. It appears to   be functioning normally.    When compared to the previous study dated 9/3/2020, no significant   change.          Cardiac Catheterization:   Results for orders placed during the hospital encounter of 04/05/19   Cardiac Catheterization [CATH01] 04/05/2019    Narrative Mechanical AV - [employer]winder - difficult to visualize, but both valves appear   to move approriately    Mechanical MV - [employer]inder - both valves open appropriately - noted   intermittently one valve leaflet opens before or after the other valve -   can be either valve.   No sign either leaflet is obstructed or does not   open or close  completely.            Results for orders placed in visit on 03/20/19   Echo Stress Exercise [ECH07]        Imaging:    Holter Monitor    Result Date: 11/13/2020  HOLTER MONITOR INTERPRETATION DATE:  11/13/2020 TEST DATE:  11/09/2020 INTERPRETATION:  Predominant rhythm is atrial fibrillation, which is present throughout the monitoring period.  Ventricular response ranged from 53 beats per minute to 133 beats per minute, averaging 87 beats per minute.  Average resting ventricular response was approximately 75 beats per minute.  Average ventricular response with activity was 80 to 115 beats per minute.  There were no significant pauses.  Occasional ventricular premature beats were noted, 483 over 24 hours. There were 11 couplets noted.  Predominant PVC morphology was a right bundle with superior axis suggesting ectopy arising from the inferior septum.  No significant symptoms were reported in the diary. CONCLUSION:  Persistent atrial fibrillation with controlled ventricular response. PJ ROWAN MD sn D 11/13/2020 12:01:24 T 11/13/2020 12:31:56 R 11/13/2020 12:31:56 [id number] cc: ANT HO MD        Lab Results:    Lab Results   Component Value Date    CREATININE 0.81 10/30/2020    BUN 23 10/30/2020     (L) 10/30/2020    K 4.3 10/31/2020    CL 90 (L) 10/30/2020    CO2 35 (H) 10/30/2020     Lab Results   Component Value Date    CHOL 95 08/07/2015    TRIG 69 08/07/2015    HDL 24 (L) 08/07/2015     BNP (pg/mL)   Date Value   10/19/2020 631 (H)   09/11/2020 198 (H)   05/14/2019 55     Creatinine (mg/dL)   Date Value   10/30/2020 0.81   10/29/2020 0.71   10/28/2020 0.74   10/27/2020 0.72     Magnesium (mg/dL)   Date Value   10/24/2020 2.2   10/21/2020 2.1   10/19/2020 2.0     LDL Calculated (mg/dL)   Date Value   08/07/2015 57     Lab Results   Component Value Date    WBC 3.7 (L) 10/30/2020    WBC 4.5 05/19/2015    HGB 10.3 (L) 10/30/2020    HCT 33.7 (L) 10/30/2020    MCV 92 10/30/2020      10/30/2020           Much or all of the text in this note was generated through the use of the Dragon Dictate voice-to-text software. Errors in spelling or words which seem out of context are unintentional. Sound alike errors, in particular, may have escaped editing.

## 2021-07-14 PROBLEM — I50.23 ACUTE ON CHRONIC SYSTOLIC HEART FAILURE (H): Status: RESOLVED | Noted: 2020-01-01 | Resolved: 2020-01-01

## 2021-07-14 PROBLEM — Z86.73 HISTORY OF CVA (CEREBROVASCULAR ACCIDENT): Status: RESOLVED | Noted: 2019-12-05 | Resolved: 2020-01-01

## 2021-07-14 PROBLEM — I50.31 ACUTE DIASTOLIC CONGESTIVE HEART FAILURE (H): Status: RESOLVED | Noted: 2020-01-01 | Resolved: 2020-01-01

## 2022-01-25 NOTE — TELEPHONE ENCOUNTER
Message rec'd 7-31-20 @ 1044:          She may take it for up to three days, but probably also best to have her seen in [facility] as she has a fragile heart condition with two heart valve surgeries.     Jax Lima MD        Home

## (undated) RX ORDER — FENTANYL CITRATE 50 UG/ML
INJECTION, SOLUTION INTRAMUSCULAR; INTRAVENOUS
Status: DISPENSED
Start: 2020-01-01